# Patient Record
Sex: FEMALE | Race: WHITE | HISPANIC OR LATINO | Employment: FULL TIME | URBAN - METROPOLITAN AREA
[De-identification: names, ages, dates, MRNs, and addresses within clinical notes are randomized per-mention and may not be internally consistent; named-entity substitution may affect disease eponyms.]

---

## 2024-07-16 ENCOUNTER — TELEPHONE (OUTPATIENT)
Dept: OBSTETRICS AND GYNECOLOGY | Age: 69
End: 2024-07-16

## 2024-07-16 NOTE — TELEPHONE ENCOUNTER
[facility] staff attempted to follow warm transfer process and was unsuccessful     Caller: Jodi Wick    Relationship to patient: Self    Best call back number: [phone].- IS NEEDED.  IF NO ANSWER PLEASE CALL DAUGHTER JANY BARBOSA [phone]    Patient is needing: PATIENT ACCIDENTALLY CANCELED ULTRASOUND AND NEW GYN APPT ON 09/13/24 AT 12:30PM FOR ULTRASOUND AND 12:45 PM FOR NEW GYN WITH . PATIENT IS REQUESTING TO RESCHEDULE FOR SAME DAY AND TIME. [facility] WAS GIVEN OKAY BY OFFICE TO SCHEDULE ULTRASOUND. NEXT AVAILABLE IS 10/10/24 WITH BAKER. PATIENT IS REQUESTING TO BE SEEN SOONER.

## 2024-08-16 ENCOUNTER — OFFICE VISIT (OUTPATIENT)
Dept: OBSTETRICS AND GYNECOLOGY | Age: 69
End: 2024-08-16
Payer: COMMERCIAL

## 2024-08-16 VITALS
BODY MASS INDEX: 25.46 KG/M2 | WEIGHT: 110 LBS | SYSTOLIC BLOOD PRESSURE: 134 MMHG | HEIGHT: 55 IN | DIASTOLIC BLOOD PRESSURE: 72 MMHG

## 2024-08-16 DIAGNOSIS — Z12.31 SCREENING MAMMOGRAM FOR BREAST CANCER: ICD-10-CM

## 2024-08-16 DIAGNOSIS — N95.0 PMB (POSTMENOPAUSAL BLEEDING): ICD-10-CM

## 2024-08-16 DIAGNOSIS — Z13.89 SCREENING FOR BLOOD OR PROTEIN IN URINE: ICD-10-CM

## 2024-08-16 DIAGNOSIS — N85.9 LESION OF ENDOMETRIUM: ICD-10-CM

## 2024-08-16 DIAGNOSIS — R10.2 PELVIC PAIN: ICD-10-CM

## 2024-08-16 DIAGNOSIS — Z01.419 WELL FEMALE EXAM WITH ROUTINE GYNECOLOGICAL EXAM: Primary | ICD-10-CM

## 2024-08-16 RX ORDER — ENALAPRIL MALEATE 20 MG/1
10 TABLET ORAL
COMMUNITY
Start: 2024-08-15

## 2024-08-16 RX ORDER — HYDROCHLOROTHIAZIDE 25 MG/1
12.5 TABLET ORAL
COMMUNITY
Start: 2024-08-15

## 2024-08-16 RX ORDER — CETIRIZINE HYDROCHLORIDE 10 MG/1
10 TABLET ORAL
COMMUNITY
Start: 2024-07-17

## 2024-08-16 RX ORDER — ALBUTEROL SULFATE 90 UG/1
2 AEROSOL, METERED RESPIRATORY (INHALATION) EVERY 4 HOURS PRN
COMMUNITY
Start: 2024-08-08

## 2024-08-16 RX ORDER — AMLODIPINE BESYLATE 10 MG/1
5 TABLET ORAL
COMMUNITY
Start: 2024-08-15

## 2024-08-16 NOTE — PROGRESS NOTES
[facility]   Obstetrics and Gynecology     2024    Patient: Jodi Maier          MR#:[id number]    History of Present Illness    Chief Complaint   Patient presents with    Gynecologic Exam     CC: New Annual and U/S results. Right side pelvic pain past 1 year,        69 y.o. female  who presents for annual exam.    Patient is from [place] newly immigrated to the [place] with complaint of 1 history of lower back pain and pelvic pain.  Screening ultrasound is remarkable for a distended endometrial cavity with a fluid collection and a 1.2 cm echogenic lesion that appears most consistent with a polyp.  The patient does report some periodic bloody discharge consistent with postmenopausal bleeding.  Past medical history only remarkable for well-controlled hypertension.    Relevant data reviewed:    No LMP recorded (lmp unknown). Patient is postmenopausal.  Obstetric History:  OB History          3    Para   2    Term   2            AB   1    Living   2         SAB   1    IAB        Ectopic        Molar        Multiple        Live Births   2               Menstrual History:     No LMP recorded (lmp unknown). Patient is postmenopausal.       Social History     Substance and Sexual Activity   Sexual Activity Not Currently    Partners: Male     ______________________________________  There is no problem list on file for this patient.    Past Medical History:   Diagnosis Date    Hypertension      History reviewed. No pertinent surgical history.  Social History     Tobacco Use   Smoking Status Never    Passive exposure: Never   Smokeless Tobacco Never     Family History   Problem Relation Age of Onset    Hypertension Father     Hypertension Mother     Breast cancer Neg Hx     Ovarian cancer Neg Hx     Uterine cancer Neg Hx     Colon cancer Neg Hx      Prior to Admission medications    Medication Sig Start Date End Date Taking? Authorizing Provider   albuterol sulfate HFA  "108 (90 Base) MCG/ACT inhaler Inhale 2 puffs Every 4 (Four) Hours As Needed for Shortness of Air. 8/8/24  Yes Provider, MD Belgica   amLODIPine (NORVASC) 10 MG tablet 0.5 tablets. 8/15/24  Yes Provider, MD Belgica   cetirizine (zyrTEC) 10 MG tablet Take 1 tablet by mouth every night at bedtime. 7/17/24  Yes ProviderBelgica MD   enalapril (VASOTEC) 20 MG tablet 0.5 tablets. 8/15/24  Yes Provider, MD Belgica   hydroCHLOROthiazide 25 MG tablet 0.5 tablets. 8/15/24  Yes Provider, MD Belgica     _______________________________________    Current contraception: post menopausal status  History of abnormal Pap smear: no  Family history of uterine or ovarian cancer: no  Family History of colon cancer/colon polyps: no  History of abnormal mammogram: no  History of abnormal lipids: no    The following portions of the patient's history were reviewed and updated as appropriate: allergies, current medications, past family history, past medical history, past social history, past surgical history, and problem list.    Review of Systems    Pertinent items are noted in HPI.       Objective   Physical Exam    /72   Ht 121.9 cm (48\")   Wt 49.9 kg (110 lb)   LMP  (LMP Unknown)   BMI 33.57 kg/m²    BP Readings from Last 3 Encounters:   08/16/24 134/72      Wt Readings from Last 3 Encounters:   08/16/24 49.9 kg (110 lb)        BMI: Estimated body mass index is 33.57 kg/m² as calculated from the following:    Height as of this encounter: 121.9 cm (48\").    Weight as of this encounter: 49.9 kg (110 lb).       General: alert, appears stated age, and cooperative   Heart: regular rate and rhythm, S1, S2 normal, no murmur, click, rub or gallop   Lungs: clear to auscultation bilaterally   Abdomen: soft, non-tender, without masses, no organomegaly   Breast: inspection negative, no nipple discharge or bleeding, no masses or nodularity palpable   External genitalia/Vulva: External genitalia including bartholin's " glands, Urethra, Hollymead's gland and urethra meatus are normal, Perineum, rectum and anus appear normal , and Bladder appears normal without significant prolapse    Vagina: normal mucosa, normal discharge   Cervix: no lesions   Uterus: normal size and non-tender   Adnexa: normal adnexa     As part of wellness and prevention, the following topics were discussed with the patient:  Encouraged self breast exam  Physical activity and regular exercised encouraged.   Advance Care Planning   ACP discussion was held with the patient during this visit.           Problem List   Meds  History  Prep for Surg   Imagin}    Assessment:  Diagnoses and all orders for this visit:    1. Well female exam with routine gynecological exam (Primary)    2. Screening mammogram for breast cancer  -     Mammo Screening Digital Tomosynthesis Bilateral With CAD; Future    3. Screening for blood or protein in urine  -     Cancel: POC Urinalysis Dipstick    4. Lesion of endometrium  -     Case Request    5. PMB (postmenopausal bleeding)    6. Pelvic pain      Plan:  DILATATION AND CURETTAGE HYSTEROSCOPY WITH MYOSURE (N/A)      Shamar Reyes MD  2024 14:05 EDT

## 2024-08-20 ENCOUNTER — PRE-ADMISSION TESTING (OUTPATIENT)
Dept: PREADMISSION TESTING | Facility: HOSPITAL | Age: 69
End: 2024-08-20
Payer: COMMERCIAL

## 2024-08-20 VITALS
TEMPERATURE: 98.1 F | RESPIRATION RATE: 18 BRPM | OXYGEN SATURATION: 99 % | BODY MASS INDEX: 25.22 KG/M2 | HEART RATE: 80 BPM | WEIGHT: 109 LBS | DIASTOLIC BLOOD PRESSURE: 75 MMHG | SYSTOLIC BLOOD PRESSURE: 126 MMHG | HEIGHT: 55 IN

## 2024-08-20 LAB
ANION GAP SERPL CALCULATED.3IONS-SCNC: 11.7 MMOL/L (ref 5–15)
BUN SERPL-MCNC: 12 MG/DL (ref 8–23)
BUN/CREAT SERPL: 14.6 (ref 7–25)
CALCIUM SPEC-SCNC: 10.2 MG/DL (ref 8.6–10.5)
CHLORIDE SERPL-SCNC: 98 MMOL/L (ref 98–107)
CO2 SERPL-SCNC: 27.3 MMOL/L (ref 22–29)
CREAT SERPL-MCNC: 0.82 MG/DL (ref 0.57–1)
DEPRECATED RDW RBC AUTO: 38.4 FL (ref 37–54)
EGFRCR SERPLBLD CKD-EPI 2021: 78 ML/MIN/1.73
ERYTHROCYTE [DISTWIDTH] IN BLOOD BY AUTOMATED COUNT: 12.2 % (ref 12.3–15.4)
GLUCOSE SERPL-MCNC: 111 MG/DL (ref 65–99)
HCT VFR BLD AUTO: 43.7 % (ref 34–46.6)
HGB BLD-MCNC: 14.7 G/DL (ref 12–15.9)
MCH RBC QN AUTO: 29.1 PG (ref 26.6–33)
MCHC RBC AUTO-ENTMCNC: 33.6 G/DL (ref 31.5–35.7)
MCV RBC AUTO: 86.4 FL (ref 79–97)
PLATELET # BLD AUTO: 310 10*3/MM3 (ref 140–450)
PMV BLD AUTO: 9.2 FL (ref 6–12)
POTASSIUM SERPL-SCNC: 4.3 MMOL/L (ref 3.5–5.2)
RBC # BLD AUTO: 5.06 10*6/MM3 (ref 3.77–5.28)
SODIUM SERPL-SCNC: 137 MMOL/L (ref 136–145)
WBC NRBC COR # BLD AUTO: 9.28 10*3/MM3 (ref 3.4–10.8)

## 2024-08-20 PROCEDURE — 85027 COMPLETE CBC AUTOMATED: CPT

## 2024-08-20 PROCEDURE — 36415 COLL VENOUS BLD VENIPUNCTURE: CPT

## 2024-08-20 PROCEDURE — 80048 BASIC METABOLIC PNL TOTAL CA: CPT

## 2024-08-20 PROCEDURE — 93005 ELECTROCARDIOGRAM TRACING: CPT

## 2024-08-20 NOTE — DISCHARGE INSTRUCTIONS
Take the following medications the morning of surgery:      BRING YOUR INHALERS WITH YOU    If you are on prescription narcotic pain medication to control your pain you may also take that medication the morning of surgery.      General Instructions:     Do not eat solid food after midnight the night before surgery.  Clear liquids day of surgery are allowed but must be stopped at least two hours before your hospital arrival time.       Allowed clear liquids      Water, sodas, and tea or coffee with no cream or milk added.       12 to 20 ounces of a clear liquid that contains carbohydrates is recommended.  If non-diabetic, have Gatorade or Powerade.  If diabetic, have G2 or Powerade Zero.     Do not have liquids red in color.  Do not consume chicken, beef, pork or vegetable broth or bouillon cubes of any variety as they are not considered clear liquids and are not allowed.      Infants may have breast milk up to four hours before surgery.  Infants drinking formula may drink formula up to six hours before surgery.   Patients who avoid smoking, chewing tobacco and alcohol for 4 weeks prior to surgery have a reduced risk of post-operative complications.  Quit smoking as many days before surgery as you can.  Do not smoke, use chewing tobacco or drink alcohol the day of surgery.   If applicable bring your C-PAP/ BI-PAP machine in with you to preop day of surgery.  Bring any papers given to you in the doctor’s office.  Wear clean comfortable clothes.  Do not wear contact lenses, false eyelashes or make-up.  Bring a case for your glasses.   Bring crutches or walker if applicable.  Remove all piercings.  Leave jewelry and any other valuables at home.  Hair extensions with metal clips must be removed prior to surgery.  The Pre-Admission Testing nurse will instruct you to bring medications if unable to obtain an accurate list in Pre-Admission Testing.        Preventing a Surgical Site Infection:  For 2 to 3 days before surgery,  avoid shaving with a razor because the razor can irritate skin and make it easier to develop an infection.    Any areas of open skin can increase the risk of a post-operative wound infection by allowing bacteria to enter and travel throughout the body.  Notify your surgeon if you have any skin wounds / rashes even if it is not near the expected surgical site.  The area will need assessed to determine if surgery should be delayed until it is healed.  The night prior to surgery shower using a fresh bar of anti-bacterial soap (such as Dial) and clean washcloth.  Sleep in a clean bed with clean clothing.  Do not allow pets to sleep with you.  Shower on the morning of surgery using a fresh bar of anti-bacterial soap (such as Dial) and clean washcloth.  Dry with a clean towel and dress in clean clothing.  Ask your surgeon if you will be receiving antibiotics prior to surgery.  Make sure you, your family, and all healthcare providers clean their hands with soap and water or an alcohol based hand  before caring for you or your wound.    Day of surgery:  Your arrival time is approximately two hours before your scheduled surgery time.  Please note if you have an early arrival time the surgery doors do not open before 5:00 AM.  Upon arrival, a Pre-op nurse and Anesthesiologist will review your health history, obtain vital signs, and answer questions you may have.  The only belongings needed at this time will be a list of your home medications and if applicable your C-PAP/BI-PAP machine.  A Pre-op nurse will start an IV and you may receive medication in preparation for surgery, including something to help you relax.     Please be aware that surgery does come with discomfort.  We want to make every effort to control your discomfort so please discuss any uncontrolled symptoms with your nurse.   Your doctor will most likely have prescribed pain medications.      If you are going home after surgery you will receive  individualized written care instructions before being discharged.  A responsible adult must drive you to and from the hospital on the day of your surgery and ideally stay with you through the night.   .  Discharge prescriptions can be filled by the hospital pharmacy during regular pharmacy hours.  If you are having surgery late in the day/evening your prescription may be e-prescribed to your pharmacy.  Please verify your pharmacy hours or chose a 24 hour pharmacy to avoid not having access to your prescription because your pharmacy has closed for the day.    If you are staying overnight following surgery, you will be transported to your hospital room following the recovery period.  [facility] has all private rooms.    If you have any questions please call Pre-Admission Testing at [phone].  Deductibles and co-payments are collected on the day of service. Please be prepared to pay the required co-pay, deductible or deposit on the day of service as defined by your plan.    Call your surgeon immediately if you experience any of the following symptoms:  Sore Throat  Shortness of Breath or difficulty breathing  Cough  Chills  Body soreness or muscle pain  Headache  Fever  New loss of taste or smell  Do not arrive for your surgery ill.  Your procedure will need to be rescheduled to another time.  You will need to call your physician before the day of surgery to avoid any unnecessary exposure to hospital staff as well as other patients.

## 2024-08-21 PROBLEM — N95.0 PMB (POSTMENOPAUSAL BLEEDING): Status: ACTIVE | Noted: 2024-08-21

## 2024-08-21 LAB
CYTOLOGIST CVX/VAG CYTO: NORMAL
CYTOLOGY CVX/VAG DOC CYTO: NORMAL
CYTOLOGY CVX/VAG DOC THIN PREP: NORMAL
DX ICD CODE: NORMAL
HPV I/H RISK 4 DNA CVX QL PROBE+SIG AMP: NEGATIVE
Lab: NORMAL
OTHER STN SPEC: NORMAL
QT INTERVAL: 396 MS
QTC INTERVAL: 443 MS
STAT OF ADQ CVX/VAG CYTO-IMP: NORMAL

## 2024-08-22 ENCOUNTER — ANESTHESIA EVENT (OUTPATIENT)
Dept: PERIOP | Facility: HOSPITAL | Age: 69
End: 2024-08-22
Payer: COMMERCIAL

## 2024-08-22 ENCOUNTER — HOSPITAL ENCOUNTER (OUTPATIENT)
Facility: HOSPITAL | Age: 69
Setting detail: HOSPITAL OUTPATIENT SURGERY
Discharge: HOME | End: 2024-08-22
Attending: OBSTETRICS & GYNECOLOGY | Admitting: OBSTETRICS & GYNECOLOGY
Payer: COMMERCIAL

## 2024-08-22 ENCOUNTER — ANESTHESIA (OUTPATIENT)
Dept: PERIOP | Facility: HOSPITAL | Age: 69
End: 2024-08-22
Payer: COMMERCIAL

## 2024-08-22 VITALS
RESPIRATION RATE: 18 BRPM | TEMPERATURE: 97.8 F | HEART RATE: 69 BPM | DIASTOLIC BLOOD PRESSURE: 67 MMHG | SYSTOLIC BLOOD PRESSURE: 121 MMHG | OXYGEN SATURATION: 99 %

## 2024-08-22 DIAGNOSIS — N85.9 LESION OF ENDOMETRIUM: ICD-10-CM

## 2024-08-22 PROCEDURE — 25010000002 MIDAZOLAM PER 1 MG: Performed by: ANESTHESIOLOGY

## 2024-08-22 PROCEDURE — 88305 TISSUE EXAM BY PATHOLOGIST: CPT | Performed by: OBSTETRICS & GYNECOLOGY

## 2024-08-22 PROCEDURE — 25010000002 ONDANSETRON PER 1 MG: Performed by: NURSE ANESTHETIST, CERTIFIED REGISTERED

## 2024-08-22 PROCEDURE — 25010000002 DEXAMETHASONE SODIUM PHOSPHATE 20 MG/5ML SOLUTION: Performed by: NURSE ANESTHETIST, CERTIFIED REGISTERED

## 2024-08-22 PROCEDURE — 25010000002 CEFAZOLIN PER 500 MG: Performed by: OBSTETRICS & GYNECOLOGY

## 2024-08-22 PROCEDURE — C1782 MORCELLATOR: HCPCS | Performed by: OBSTETRICS & GYNECOLOGY

## 2024-08-22 PROCEDURE — 58561 HYSTEROSCOPY REMOVE MYOMA: CPT | Performed by: OBSTETRICS & GYNECOLOGY

## 2024-08-22 PROCEDURE — 25010000002 FENTANYL CITRATE (PF) 50 MCG/ML SOLUTION: Performed by: ANESTHESIOLOGY

## 2024-08-22 PROCEDURE — 25810000003 LACTATED RINGERS PER 1000 ML: Performed by: ANESTHESIOLOGY

## 2024-08-22 PROCEDURE — 25010000002 KETOROLAC TROMETHAMINE PER 15 MG: Performed by: NURSE ANESTHETIST, CERTIFIED REGISTERED

## 2024-08-22 PROCEDURE — 25010000002 PROPOFOL 200 MG/20ML EMULSION: Performed by: NURSE ANESTHETIST, CERTIFIED REGISTERED

## 2024-08-22 RX ORDER — ONDANSETRON 2 MG/ML
INJECTION INTRAMUSCULAR; INTRAVENOUS AS NEEDED
Status: DISCONTINUED | OUTPATIENT
Start: 2024-08-22 | End: 2024-08-22 | Stop reason: SURG

## 2024-08-22 RX ORDER — NALOXONE HCL 0.4 MG/ML
0.2 VIAL (ML) INJECTION AS NEEDED
Status: DISCONTINUED | OUTPATIENT
Start: 2024-08-22 | End: 2024-08-22 | Stop reason: HOSPADM

## 2024-08-22 RX ORDER — IPRATROPIUM BROMIDE AND ALBUTEROL SULFATE 2.5; .5 MG/3ML; MG/3ML
3 SOLUTION RESPIRATORY (INHALATION) ONCE AS NEEDED
Status: DISCONTINUED | OUTPATIENT
Start: 2024-08-22 | End: 2024-08-22 | Stop reason: HOSPADM

## 2024-08-22 RX ORDER — MAGNESIUM HYDROXIDE 1200 MG/15ML
LIQUID ORAL AS NEEDED
Status: DISCONTINUED | OUTPATIENT
Start: 2024-08-22 | End: 2024-08-22 | Stop reason: HOSPADM

## 2024-08-22 RX ORDER — FENTANYL CITRATE 50 UG/ML
50 INJECTION, SOLUTION INTRAMUSCULAR; INTRAVENOUS ONCE AS NEEDED
Status: COMPLETED | OUTPATIENT
Start: 2024-08-22 | End: 2024-08-22

## 2024-08-22 RX ORDER — EPHEDRINE SULFATE 50 MG/ML
5 INJECTION, SOLUTION INTRAVENOUS ONCE AS NEEDED
Status: DISCONTINUED | OUTPATIENT
Start: 2024-08-22 | End: 2024-08-22 | Stop reason: HOSPADM

## 2024-08-22 RX ORDER — IBUPROFEN 600 MG/1
600 TABLET, FILM COATED ORAL EVERY 6 HOURS PRN
Qty: 30 TABLET | Refills: 1 | Status: SHIPPED | OUTPATIENT
Start: 2024-08-22 | End: 2024-08-28 | Stop reason: HOSPADM

## 2024-08-22 RX ORDER — HYDROMORPHONE HYDROCHLORIDE 1 MG/ML
0.25 INJECTION, SOLUTION INTRAMUSCULAR; INTRAVENOUS; SUBCUTANEOUS
Status: DISCONTINUED | OUTPATIENT
Start: 2024-08-22 | End: 2024-08-22 | Stop reason: HOSPADM

## 2024-08-22 RX ORDER — DEXAMETHASONE SODIUM PHOSPHATE 4 MG/ML
INJECTION, SOLUTION INTRA-ARTICULAR; INTRALESIONAL; INTRAMUSCULAR; INTRAVENOUS; SOFT TISSUE AS NEEDED
Status: DISCONTINUED | OUTPATIENT
Start: 2024-08-22 | End: 2024-08-22 | Stop reason: SURG

## 2024-08-22 RX ORDER — PROPOFOL 10 MG/ML
INJECTION, EMULSION INTRAVENOUS AS NEEDED
Status: DISCONTINUED | OUTPATIENT
Start: 2024-08-22 | End: 2024-08-22 | Stop reason: SURG

## 2024-08-22 RX ORDER — LIDOCAINE HYDROCHLORIDE 20 MG/ML
INJECTION, SOLUTION INFILTRATION; PERINEURAL AS NEEDED
Status: DISCONTINUED | OUTPATIENT
Start: 2024-08-22 | End: 2024-08-22 | Stop reason: SURG

## 2024-08-22 RX ORDER — DIPHENHYDRAMINE HYDROCHLORIDE 50 MG/ML
12.5 INJECTION INTRAMUSCULAR; INTRAVENOUS
Status: DISCONTINUED | OUTPATIENT
Start: 2024-08-22 | End: 2024-08-22 | Stop reason: HOSPADM

## 2024-08-22 RX ORDER — MIDAZOLAM HYDROCHLORIDE 1 MG/ML
0.5 INJECTION INTRAMUSCULAR; INTRAVENOUS
Status: DISCONTINUED | OUTPATIENT
Start: 2024-08-22 | End: 2024-08-22 | Stop reason: HOSPADM

## 2024-08-22 RX ORDER — FLUMAZENIL 0.1 MG/ML
0.2 INJECTION INTRAVENOUS AS NEEDED
Status: DISCONTINUED | OUTPATIENT
Start: 2024-08-22 | End: 2024-08-22 | Stop reason: HOSPADM

## 2024-08-22 RX ORDER — PROMETHAZINE HYDROCHLORIDE 25 MG/1
25 SUPPOSITORY RECTAL ONCE AS NEEDED
Status: DISCONTINUED | OUTPATIENT
Start: 2024-08-22 | End: 2024-08-22 | Stop reason: HOSPADM

## 2024-08-22 RX ORDER — HYDROCODONE BITARTRATE AND ACETAMINOPHEN 7.5; 325 MG/1; MG/1
1 TABLET ORAL EVERY 4 HOURS PRN
Status: DISCONTINUED | OUTPATIENT
Start: 2024-08-22 | End: 2024-08-22 | Stop reason: HOSPADM

## 2024-08-22 RX ORDER — DROPERIDOL 2.5 MG/ML
0.62 INJECTION, SOLUTION INTRAMUSCULAR; INTRAVENOUS
Status: DISCONTINUED | OUTPATIENT
Start: 2024-08-22 | End: 2024-08-22 | Stop reason: HOSPADM

## 2024-08-22 RX ORDER — MONTELUKAST SODIUM 10 MG/1
10 TABLET ORAL NIGHTLY
COMMUNITY

## 2024-08-22 RX ORDER — LIDOCAINE HYDROCHLORIDE 10 MG/ML
0.5 INJECTION, SOLUTION INFILTRATION; PERINEURAL ONCE AS NEEDED
Status: DISCONTINUED | OUTPATIENT
Start: 2024-08-22 | End: 2024-08-22 | Stop reason: HOSPADM

## 2024-08-22 RX ORDER — FLUTICASONE PROPIONATE AND SALMETEROL 250; 50 UG/1; UG/1
1 POWDER RESPIRATORY (INHALATION) DAILY
COMMUNITY

## 2024-08-22 RX ORDER — FENTANYL CITRATE 50 UG/ML
25 INJECTION, SOLUTION INTRAMUSCULAR; INTRAVENOUS
Status: DISCONTINUED | OUTPATIENT
Start: 2024-08-22 | End: 2024-08-22 | Stop reason: HOSPADM

## 2024-08-22 RX ORDER — ONDANSETRON 2 MG/ML
4 INJECTION INTRAMUSCULAR; INTRAVENOUS ONCE AS NEEDED
Status: COMPLETED | OUTPATIENT
Start: 2024-08-22 | End: 2024-08-22

## 2024-08-22 RX ORDER — PROMETHAZINE HYDROCHLORIDE 25 MG/1
25 TABLET ORAL ONCE AS NEEDED
Status: DISCONTINUED | OUTPATIENT
Start: 2024-08-22 | End: 2024-08-22 | Stop reason: HOSPADM

## 2024-08-22 RX ORDER — HYDROCODONE BITARTRATE AND ACETAMINOPHEN 5; 325 MG/1; MG/1
1 TABLET ORAL ONCE AS NEEDED
Status: COMPLETED | OUTPATIENT
Start: 2024-08-22 | End: 2024-08-22

## 2024-08-22 RX ORDER — FAMOTIDINE 10 MG/ML
20 INJECTION, SOLUTION INTRAVENOUS ONCE
Status: COMPLETED | OUTPATIENT
Start: 2024-08-22 | End: 2024-08-22

## 2024-08-22 RX ORDER — HYDRALAZINE HYDROCHLORIDE 20 MG/ML
5 INJECTION INTRAMUSCULAR; INTRAVENOUS
Status: DISCONTINUED | OUTPATIENT
Start: 2024-08-22 | End: 2024-08-22 | Stop reason: HOSPADM

## 2024-08-22 RX ORDER — SODIUM CHLORIDE, SODIUM LACTATE, POTASSIUM CHLORIDE, CALCIUM CHLORIDE 600; 310; 30; 20 MG/100ML; MG/100ML; MG/100ML; MG/100ML
9 INJECTION, SOLUTION INTRAVENOUS CONTINUOUS
Status: DISCONTINUED | OUTPATIENT
Start: 2024-08-22 | End: 2024-08-22 | Stop reason: HOSPADM

## 2024-08-22 RX ORDER — KETOROLAC TROMETHAMINE 30 MG/ML
INJECTION, SOLUTION INTRAMUSCULAR; INTRAVENOUS AS NEEDED
Status: DISCONTINUED | OUTPATIENT
Start: 2024-08-22 | End: 2024-08-22 | Stop reason: SURG

## 2024-08-22 RX ORDER — SODIUM CHLORIDE 0.9 % (FLUSH) 0.9 %
3 SYRINGE (ML) INJECTION EVERY 12 HOURS SCHEDULED
Status: DISCONTINUED | OUTPATIENT
Start: 2024-08-22 | End: 2024-08-22 | Stop reason: HOSPADM

## 2024-08-22 RX ORDER — LABETALOL HYDROCHLORIDE 5 MG/ML
5 INJECTION, SOLUTION INTRAVENOUS
Status: DISCONTINUED | OUTPATIENT
Start: 2024-08-22 | End: 2024-08-22 | Stop reason: HOSPADM

## 2024-08-22 RX ORDER — SODIUM CHLORIDE 0.9 % (FLUSH) 0.9 %
3-10 SYRINGE (ML) INJECTION AS NEEDED
Status: DISCONTINUED | OUTPATIENT
Start: 2024-08-22 | End: 2024-08-22 | Stop reason: HOSPADM

## 2024-08-22 RX ORDER — HYDROCODONE BITARTRATE AND ACETAMINOPHEN 5; 325 MG/1; MG/1
1 TABLET ORAL EVERY 6 HOURS PRN
Qty: 5 TABLET | Refills: 0 | Status: SHIPPED | OUTPATIENT
Start: 2024-08-22

## 2024-08-22 RX ADMIN — LIDOCAINE HYDROCHLORIDE 60 MG: 20 INJECTION, SOLUTION INFILTRATION; PERINEURAL at 11:43

## 2024-08-22 RX ADMIN — SODIUM CHLORIDE, POTASSIUM CHLORIDE, SODIUM LACTATE AND CALCIUM CHLORIDE 9 ML/HR: 600; 310; 30; 20 INJECTION, SOLUTION INTRAVENOUS at 10:50

## 2024-08-22 RX ADMIN — PROPOFOL 100 MG: 10 INJECTION, EMULSION INTRAVENOUS at 11:43

## 2024-08-22 RX ADMIN — FENTANYL CITRATE 25 MCG: 50 INJECTION, SOLUTION INTRAMUSCULAR; INTRAVENOUS at 11:20

## 2024-08-22 RX ADMIN — HYDROCODONE BITARTRATE AND ACETAMINOPHEN 1 TABLET: 5; 325 TABLET ORAL at 12:52

## 2024-08-22 RX ADMIN — FAMOTIDINE 20 MG: 10 INJECTION INTRAVENOUS at 10:50

## 2024-08-22 RX ADMIN — ONDANSETRON 4 MG: 2 INJECTION INTRAMUSCULAR; INTRAVENOUS at 11:46

## 2024-08-22 RX ADMIN — KETOROLAC TROMETHAMINE 15 MG: 30 INJECTION, SOLUTION INTRAMUSCULAR; INTRAVENOUS at 12:02

## 2024-08-22 RX ADMIN — SODIUM CHLORIDE 2000 MG: 900 INJECTION INTRAVENOUS at 11:33

## 2024-08-22 RX ADMIN — DEXAMETHASONE SODIUM PHOSPHATE 6 MG: 4 INJECTION, SOLUTION INTRAMUSCULAR; INTRAVENOUS at 11:46

## 2024-08-22 RX ADMIN — MIDAZOLAM 0.5 MG: 1 INJECTION INTRAMUSCULAR; INTRAVENOUS at 11:07

## 2024-08-22 RX ADMIN — ONDANSETRON 4 MG: 2 INJECTION INTRAMUSCULAR; INTRAVENOUS at 14:09

## 2024-08-22 NOTE — H&P
[facility]   Obstetrics and Gynecology   History & Physical    2024    Patient: Jodi Maier          MR#:[id number]    Chief complaint:  PMB    Subjective     69 y.o. female  from [place] newly immig[place] to the [place] with complaint of 1 history of lower back pain and pelvic pain. Screening ultrasound is remarkable for a distended endometrial cavity with a fluid collection and a 1.2 cm echogenic lesion that appears most consistent with a polyp. The patient does report some periodic bloody discharge consistent with postmenopausal bleeding. Past medical history only remarkable for well-controlled hypertension.     US Non-ob Transvaginal (2024 13:24)     Patient Active Problem List   Diagnosis    Lesion of endometrium    PMB (postmenopausal bleeding)       Past Medical History:   Diagnosis Date    Acid reflux     Asthma     Hypertension     Pelvic pain     PMB (postmenopausal bleeding)        Past Surgical History:   Procedure Laterality Date    NO PAST SURGERIES         Obstetric History:  OB History          3    Para   2    Term   2            AB   1    Living   2         SAB   1    IAB        Ectopic        Molar        Multiple        Live Births   2               Menstrual History:     No LMP recorded (lmp unknown). Patient is postmenopausal.       # 1 - Date: , Sex: None, Weight: None, GA: None, Type: None, Apgar1: None, Apgar5: None, Living: None, Birth Comments: None    # 2 - Date: , Sex: Female, Weight: 2863 g (6 lb 5 oz), GA: 40w0d, Type: Vaginal, Spontaneous, Apgar1: None, Apgar5: None, Living: Living, Birth Comments: None    # 3 - Date: , Sex: Female, Weight: 2722 g (6 lb), GA: 40w0d, Type: Vaginal, Spontaneous, Apgar1: None, Apgar5: None, Living: Living, Birth Comments: None      Family History   Problem Relation Age of Onset    Hypertension Mother     Hypertension Father     Breast cancer Neg Hx     Ovarian cancer Neg Hx      Uterine cancer Neg Hx     Colon cancer Neg Hx     Malig Hyperthermia Neg Hx        Social History     Tobacco Use    Smoking status: Never     Passive exposure: Never    Smokeless tobacco: Never   Vaping Use    Vaping status: Never Used   Substance Use Topics    Alcohol use: Not Currently    Drug use: Never       Penicillins      Current Facility-Administered Medications:     ceFAZolin 2000 mg IVPB in 100 mL NS (MBP), 2,000 mg, Intravenous, Once, Shamar Reyes MD    lactated ringers infusion, 9 mL/hr, Intravenous, Continuous, Bay Rider MD, Last Rate: 9 mL/hr at 08/22/24 1050, 9 mL/hr at 08/22/24 1050    lidocaine (XYLOCAINE) 1 % injection 0.5 mL, 0.5 mL, Intradermal, Once PRN, Bay Rider MD    midazolam (VERSED) injection 0.5 mg, 0.5 mg, Intravenous, Q5 Min PRN, Bay Rider MD, 0.5 mg at 08/22/24 1107    sodium chloride 0.9 % flush 3 mL, 3 mL, Intravenous, Q12H, Bay Rider MD    sodium chloride 0.9 % flush 3-10 mL, 3-10 mL, Intravenous, PRN, Bay Rider MD    Review of Systems  Review of Systems   Constitutional: Negative.    Respiratory: Negative.     Cardiovascular: Negative.    Gastrointestinal: Negative.    Genitourinary: Negative.  Positive for menstrual problem and vaginal bleeding.   Psychiatric/Behavioral: Negative.         Objective     Vital Signs  Temp:  [97.9 °F (36.6 °C)] 97.9 °F (36.6 °C)  Heart Rate:  [111] 111  Resp:  [18] 18  BP: (167)/(68) 167/68    Physical Exam:  Physical Exam  Vitals and nursing note reviewed.   Constitutional:       Appearance: Normal appearance. She is well-developed.   HENT:      Head: Normocephalic and atraumatic.   Neck:      Thyroid: No thyromegaly.      Trachea: No tracheal deviation.   Pulmonary:      Effort: Pulmonary effort is normal.   Chest:      Chest wall: No mass.   Breasts:     Right: No mass, nipple discharge or skin change.      Left: No mass, nipple discharge or skin change.   Abdominal:      General: Bowel sounds are  normal.      Palpations: Abdomen is soft. There is no mass.      Tenderness: There is no abdominal tenderness. There is no rebound.      Hernia: No hernia is present. There is no hernia in the left inguinal area.   Genitourinary:     Labia:         Right: No rash or lesion.         Left: No rash or lesion.       Vagina: Normal.   Musculoskeletal:      Cervical back: Normal range of motion and neck supple.   Skin:     Findings: No rash.   Neurological:      Mental Status: She is alert and oriented to person, place, and time.      Deep Tendon Reflexes: Reflexes are normal and symmetric.   Psychiatric:         Behavior: Behavior normal.         Thought Content: Thought content normal.         Judgment: Judgment normal.         Labs:  Results from last 7 days   Lab Units 08/20/24  1410   WBC 10*3/mm3 9.28   HEMOGLOBIN g/dL 14.7   HEMATOCRIT % 43.7   PLATELETS 10*3/mm3 310     Results from last 7 days   Lab Units 08/20/24  1410   SODIUM mmol/L 137   POTASSIUM mmol/L 4.3   CHLORIDE mmol/L 98   CO2 mmol/L 27.3   BUN mg/dL 12   CREATININE mg/dL 0.82   CALCIUM mg/dL 10.2   GLUCOSE mg/dL 111*         Assessment & Plan       Lesion of endometrium    PMB (postmenopausal bleeding)         Plan:  Hysteroscopy, Dilatation and curettage, MyoSure resection of endometrial lesion       Reviewed procedure with patient.  I discussed the risks including but not limited to bleeding, infection and damage to internal organs.  Understanding of the procedure is voiced.     Shamar Reyes MD  08/22/24  11:25 EDT      Patient Care Team:  Provider, No Known as PCP - General

## 2024-08-22 NOTE — OP NOTE
DILATATION AND CURETTAGE HYSTEROSCOPY WITH MORCELLATOR  Procedure Report    Patient Name:  Jodi Maier  YOB: 1955    Date of Surgery:  8/22/2024     Indications:  pelvic pain, endometrial mass    Pre-op Diagnosis:   Lesion of endometrium [N85.9]       Post-Op Diagnosis Codes:     * Lesion of endometrium [N85.9]    Procedure/CPT® Codes:  DILATATION AND CURETTAGE HYSTEROSCOPY WITH MYOSURE    Staff:  Surgeon(s):  Shamar Reyes MD    Anesthesia: General    Estimated Blood Loss: minimal    Implants:    Nothing was implanted during the procedure    Specimen:          Specimens       ID Source Type Tests Collected By Collected At Frozen?    A Endometrial Curettings Tissue TISSUE PATHOLOGY EXAM   Shamar Reyes MD 8/22/24 1158     Description: Endometrial curettings    B Uterus Tissue TISSUE PATHOLOGY EXAM   Shamar Reyes MD 8/22/24 1158     Description: Uterine lesion            Findings: Atrophic cavity with 2.5 cm vascular moderately calcified lesion originating from the fundus appearing most consistent with a submucosal myoma    Complications: none    Description of Procedure:     The patient was taken to the operating room and placed in supine position.  General anesthesia was administered and the patient was prepped and draped in the usual sterile fashion with her legs placed in Jonatan stirrups.  Padding was provided so that her legs did not make contact with the Jonatan stirrups.  The surgical time-out is completed for the procedure    Weighted speculum was placed in the vagina and the cervix was grasped anteriorly with a single-tooth tenaculum.  Sequential dilators were used to dilate the cervix to 14 Amharic.  The uterus sounds to 7 cm.  The MyoSure scope was inserted and the cavity was distended with normal saline.  Findings were consistent with an atrophic endometrium with a calcified vascular lesion originating from the anterior fundal region.  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  Scant bleeding is noted    A #1 curet is taken and  the cavity is systematically curetted.  Fragments of endometrium are retained for pathological examination.    Patient is taken to the recovery room in stable condition.  Sponge lap needle counts correct x 2    See images in epic:  INTRAOPERATIVE PHOTOGRAPHS - [facility], 08/22/2024 (08/22/2024)     Shamar Reyes MD     Date: 8/22/2024  Time: 12:18 EDT

## 2024-08-22 NOTE — ANESTHESIA POSTPROCEDURE EVALUATION
Patient: Jodi Maier    Procedure Summary       Date: 08/22/24 Room / Location: [facility] OR [address] [place] [address] MAIN OR    Anesthesia Start: 1137 Anesthesia Stop: 1209    Procedure: DILATATION AND CURETTAGE HYSTEROSCOPY WITH MYOSURE (Uterus) Diagnosis:       Lesion of endometrium      (Lesion of endometrium [N85.9])    Surgeons: Shamar Reyes MD Provider: Rosemary Moser MD    Anesthesia Type: general ASA Status: 2            Anesthesia Type: general    Vitals  Vitals Value Taken Time   /67 08/22/24 1300   Temp 36.6 °C (97.8 °F) 08/22/24 1207   Pulse 70 08/22/24 1301   Resp 14 08/22/24 1215   SpO2 99 % 08/22/24 1301   Vitals shown include unfiled device data.        Post Anesthesia Care and Evaluation    Anesthetic complications: No anesthetic complications

## 2024-08-22 NOTE — ANESTHESIA PREPROCEDURE EVALUATION
Anesthesia Evaluation     NPO Solid Status: > 8 hours             Airway   Mallampati: II  TM distance: >3 FB  Neck ROM: full  Dental - normal exam   (+) upper dentures    Pulmonary    (+) asthma,  (-) wheezes  Cardiovascular     ECG reviewed  Rhythm: regular    (+) hypertension      Neuro/Psych  GI/Hepatic/Renal/Endo    (+) GERD    Musculoskeletal     Abdominal    Substance History      OB/GYN          Other                    Anesthesia Plan    ASA 2     general     (  D/W R&B of GA including but not limited to: heart, lung, liver, kidney, neurologic problems, positioning injuries, dental damage, corneal abrasion and TMJ.  .)  intravenous induction     Anesthetic plan, risks, benefits, and alternatives have been provided, discussed and informed consent has been obtained with: patient.    CODE STATUS:

## 2024-08-22 NOTE — ANESTHESIA PROCEDURE NOTES
Airway  Urgency: elective    Date/Time: 8/22/2024 11:44 AM  Airway not difficult    General Information and Staff    Patient location during procedure: OR  Anesthesiologist: Rosemary Moser MD  CRNA/CAA: Margo Olivares CRNA    Indications and Patient Condition  Indications for airway management: airway protection    Preoxygenated: yes  Mask difficulty assessment: 0 - not attempted    Final Airway Details  Final airway type: supraglottic airway      Successful airway: classic  Size 3     Number of attempts at approach: 1  Assessment: lips, teeth, and gum same as pre-op and atraumatic intubation

## 2024-08-23 ENCOUNTER — TELEPHONE (OUTPATIENT)
Dept: OBSTETRICS AND GYNECOLOGY | Age: 69
End: 2024-08-23

## 2024-08-25 ENCOUNTER — APPOINTMENT (OUTPATIENT)
Dept: CT IMAGING | Facility: HOSPITAL | Age: 69
End: 2024-08-25
Payer: COMMERCIAL

## 2024-08-25 ENCOUNTER — HOSPITAL ENCOUNTER (OUTPATIENT)
Facility: HOSPITAL | Age: 69
Discharge: HOME | End: 2024-08-28
Attending: EMERGENCY MEDICINE | Admitting: INTERNAL MEDICINE
Payer: COMMERCIAL

## 2024-08-25 DIAGNOSIS — K92.2 GASTROINTESTINAL HEMORRHAGE, UNSPECIFIED GASTROINTESTINAL HEMORRHAGE TYPE: Primary | ICD-10-CM

## 2024-08-25 DIAGNOSIS — K52.9 COLITIS: ICD-10-CM

## 2024-08-25 DIAGNOSIS — R42 DIZZINESS: ICD-10-CM

## 2024-08-25 DIAGNOSIS — K55.9 COLITIS, ISCHEMIC: ICD-10-CM

## 2024-08-25 DIAGNOSIS — R10.84 GENERALIZED ABDOMINAL PAIN: ICD-10-CM

## 2024-08-25 DIAGNOSIS — R55 VASOVAGAL NEAR SYNCOPE: ICD-10-CM

## 2024-08-25 LAB
ABO GROUP BLD: NORMAL
ALBUMIN SERPL-MCNC: 3.5 G/DL (ref 3.5–5.2)
ALBUMIN/GLOB SERPL: 1.6 G/DL
ALP SERPL-CCNC: 72 U/L (ref 39–117)
ALT SERPL W P-5'-P-CCNC: 14 U/L (ref 1–33)
ANION GAP SERPL CALCULATED.3IONS-SCNC: 12.7 MMOL/L (ref 5–15)
AST SERPL-CCNC: 17 U/L (ref 1–32)
BACTERIA UR QL AUTO: ABNORMAL /HPF
BASOPHILS # BLD AUTO: 0.06 10*3/MM3 (ref 0–0.2)
BASOPHILS NFR BLD AUTO: 0.4 % (ref 0–1.5)
BILIRUB SERPL-MCNC: 0.2 MG/DL (ref 0–1.2)
BILIRUB UR QL STRIP: NEGATIVE
BLD GP AB SCN SERPL QL: NEGATIVE
BUN SERPL-MCNC: 19 MG/DL (ref 8–23)
BUN/CREAT SERPL: 14.4 (ref 7–25)
CALCIUM SPEC-SCNC: 9.4 MG/DL (ref 8.6–10.5)
CHLORIDE SERPL-SCNC: 103 MMOL/L (ref 98–107)
CLARITY UR: CLEAR
CO2 SERPL-SCNC: 22.3 MMOL/L (ref 22–29)
COLOR UR: YELLOW
CREAT SERPL-MCNC: 1.32 MG/DL (ref 0.57–1)
DEPRECATED RDW RBC AUTO: 39.8 FL (ref 37–54)
EGFRCR SERPLBLD CKD-EPI 2021: 44.1 ML/MIN/1.73
EOSINOPHIL # BLD AUTO: 0.08 10*3/MM3 (ref 0–0.4)
EOSINOPHIL NFR BLD AUTO: 0.5 % (ref 0.3–6.2)
ERYTHROCYTE [DISTWIDTH] IN BLOOD BY AUTOMATED COUNT: 12.4 % (ref 12.3–15.4)
GLOBULIN UR ELPH-MCNC: 2.2 GM/DL
GLUCOSE SERPL-MCNC: 119 MG/DL (ref 65–99)
GLUCOSE UR STRIP-MCNC: NEGATIVE MG/DL
HCT VFR BLD AUTO: 35.7 % (ref 34–46.6)
HGB BLD-MCNC: 11.9 G/DL (ref 12–15.9)
HGB UR QL STRIP.AUTO: ABNORMAL
HYALINE CASTS UR QL AUTO: ABNORMAL /LPF
IMM GRANULOCYTES # BLD AUTO: 0.06 10*3/MM3 (ref 0–0.05)
IMM GRANULOCYTES NFR BLD AUTO: 0.4 % (ref 0–0.5)
KETONES UR QL STRIP: NEGATIVE
LEUKOCYTE ESTERASE UR QL STRIP.AUTO: ABNORMAL
LIPASE SERPL-CCNC: 103 U/L (ref 13–60)
LYMPHOCYTES # BLD AUTO: 4.66 10*3/MM3 (ref 0.7–3.1)
LYMPHOCYTES NFR BLD AUTO: 31.6 % (ref 19.6–45.3)
MCH RBC QN AUTO: 29.2 PG (ref 26.6–33)
MCHC RBC AUTO-ENTMCNC: 33.3 G/DL (ref 31.5–35.7)
MCV RBC AUTO: 87.5 FL (ref 79–97)
MONOCYTES # BLD AUTO: 0.83 10*3/MM3 (ref 0.1–0.9)
MONOCYTES NFR BLD AUTO: 5.6 % (ref 5–12)
NEUTROPHILS NFR BLD AUTO: 61.5 % (ref 42.7–76)
NEUTROPHILS NFR BLD AUTO: 9.08 10*3/MM3 (ref 1.7–7)
NITRITE UR QL STRIP: NEGATIVE
NRBC BLD AUTO-RTO: 0 /100 WBC (ref 0–0.2)
PH UR STRIP.AUTO: 7 [PH] (ref 5–8)
PLATELET # BLD AUTO: 213 10*3/MM3 (ref 140–450)
PMV BLD AUTO: 9.7 FL (ref 6–12)
POTASSIUM SERPL-SCNC: 3.5 MMOL/L (ref 3.5–5.2)
PROT SERPL-MCNC: 5.7 G/DL (ref 6–8.5)
PROT UR QL STRIP: ABNORMAL
QT INTERVAL: 390 MS
QTC INTERVAL: 464 MS
RBC # BLD AUTO: 4.08 10*6/MM3 (ref 3.77–5.28)
RBC # UR STRIP: ABNORMAL /HPF
REF LAB TEST METHOD: ABNORMAL
RH BLD: POSITIVE
SODIUM SERPL-SCNC: 138 MMOL/L (ref 136–145)
SP GR UR STRIP: 1.01 (ref 1–1.03)
SQUAMOUS #/AREA URNS HPF: ABNORMAL /HPF
T&S EXPIRATION DATE: NORMAL
TROPONIN T SERPL HS-MCNC: 11 NG/L
UROBILINOGEN UR QL STRIP: ABNORMAL
WBC # UR STRIP: ABNORMAL /HPF
WBC NRBC COR # BLD AUTO: 14.77 10*3/MM3 (ref 3.4–10.8)

## 2024-08-25 PROCEDURE — 25810000003 SODIUM CHLORIDE 0.9 % SOLUTION: Performed by: EMERGENCY MEDICINE

## 2024-08-25 PROCEDURE — 72125 CT NECK SPINE W/O DYE: CPT

## 2024-08-25 PROCEDURE — G0378 HOSPITAL OBSERVATION PER HR: HCPCS

## 2024-08-25 PROCEDURE — 36415 COLL VENOUS BLD VENIPUNCTURE: CPT

## 2024-08-25 PROCEDURE — 85025 COMPLETE CBC W/AUTO DIFF WBC: CPT | Performed by: EMERGENCY MEDICINE

## 2024-08-25 PROCEDURE — 86901 BLOOD TYPING SEROLOGIC RH(D): CPT | Performed by: EMERGENCY MEDICINE

## 2024-08-25 PROCEDURE — 25510000001 IOPAMIDOL PER 1 ML: Performed by: EMERGENCY MEDICINE

## 2024-08-25 PROCEDURE — 80053 COMPREHEN METABOLIC PANEL: CPT | Performed by: EMERGENCY MEDICINE

## 2024-08-25 PROCEDURE — 96375 TX/PRO/DX INJ NEW DRUG ADDON: CPT

## 2024-08-25 PROCEDURE — 86900 BLOOD TYPING SEROLOGIC ABO: CPT | Performed by: EMERGENCY MEDICINE

## 2024-08-25 PROCEDURE — 86850 RBC ANTIBODY SCREEN: CPT | Performed by: EMERGENCY MEDICINE

## 2024-08-25 PROCEDURE — 70450 CT HEAD/BRAIN W/O DYE: CPT

## 2024-08-25 PROCEDURE — 84484 ASSAY OF TROPONIN QUANT: CPT | Performed by: EMERGENCY MEDICINE

## 2024-08-25 PROCEDURE — 74177 CT ABD & PELVIS W/CONTRAST: CPT

## 2024-08-25 PROCEDURE — 93010 ELECTROCARDIOGRAM REPORT: CPT | Performed by: INTERNAL MEDICINE

## 2024-08-25 PROCEDURE — 99291 CRITICAL CARE FIRST HOUR: CPT

## 2024-08-25 PROCEDURE — 83690 ASSAY OF LIPASE: CPT | Performed by: EMERGENCY MEDICINE

## 2024-08-25 PROCEDURE — 71275 CT ANGIOGRAPHY CHEST: CPT

## 2024-08-25 PROCEDURE — 93005 ELECTROCARDIOGRAM TRACING: CPT | Performed by: EMERGENCY MEDICINE

## 2024-08-25 PROCEDURE — 81001 URINALYSIS AUTO W/SCOPE: CPT | Performed by: EMERGENCY MEDICINE

## 2024-08-25 RX ORDER — PANTOPRAZOLE SODIUM 40 MG/10ML
80 INJECTION, POWDER, LYOPHILIZED, FOR SOLUTION INTRAVENOUS ONCE
Status: COMPLETED | OUTPATIENT
Start: 2024-08-25 | End: 2024-08-25

## 2024-08-25 RX ORDER — SODIUM CHLORIDE 0.9 % (FLUSH) 0.9 %
10 SYRINGE (ML) INJECTION AS NEEDED
Status: DISCONTINUED | OUTPATIENT
Start: 2024-08-25 | End: 2024-08-28 | Stop reason: HOSPADM

## 2024-08-25 RX ORDER — IOPAMIDOL 755 MG/ML
100 INJECTION, SOLUTION INTRAVASCULAR
Status: COMPLETED | OUTPATIENT
Start: 2024-08-25 | End: 2024-08-25

## 2024-08-25 RX ADMIN — PANTOPRAZOLE SODIUM 80 MG: 40 INJECTION, POWDER, FOR SOLUTION INTRAVENOUS at 21:07

## 2024-08-25 RX ADMIN — IOPAMIDOL 95 ML: 755 INJECTION, SOLUTION INTRAVENOUS at 20:36

## 2024-08-25 RX ADMIN — SODIUM CHLORIDE 1000 ML: 9 INJECTION, SOLUTION INTRAVENOUS at 20:45

## 2024-08-25 NOTE — ED PROVIDER NOTES
EMERGENCY DEPARTMENT ENCOUNTER    Room Number:  11/11  PCP: Provider, No Known    HPI:  Chief Complaint: Nausea  A complete HPI/ROS/PMH/PSH/SH/FH are unobtainable due to: None  Context: Jodi Maier is a 69 y.o. female who presents to the ED c/o acute nausea with lightheadedness and generalized fatigue.  Daughter states that she has been feeling weak all day.  Complained of nausea and some vague abdominal pain.  Patient cannot describe this better.  Recently had D&C performed 3 days ago for endometrial mass.  She has had some vaginal spotting but no heavy bleeding.  She threw up once on the patio when she was outside by the pool with family.  She denies having any headache, chest pain or shortness of breath.        PAST MEDICAL HISTORY  Active Ambulatory Problems     Diagnosis Date Noted    Lesion of endometrium 08/16/2024    PMB (postmenopausal bleeding) 08/21/2024     Resolved Ambulatory Problems     Diagnosis Date Noted    No Resolved Ambulatory Problems     Past Medical History:   Diagnosis Date    Acid reflux     Asthma     Hypertension     Pelvic pain          PAST SURGICAL HISTORY  Past Surgical History:   Procedure Laterality Date    D & C HYSTEROSCOPY N/A 8/22/2024    Procedure: DILATATION AND CURETTAGE HYSTEROSCOPY WITH MYOSURE;  Surgeon: Shamar Reyes MD;  Location: [facility];  Service: Obstetrics/Gynecology;  Laterality: N/A;    NO PAST SURGERIES           FAMILY HISTORY  Family History   Problem Relation Age of Onset    Hypertension Mother     Hypertension Father     Breast cancer Neg Hx     Ovarian cancer Neg Hx     Uterine cancer Neg Hx     Colon cancer Neg Hx     Malig Hyperthermia Neg Hx          SOCIAL HISTORY  Social History     Socioeconomic History    Marital status:    Tobacco Use    Smoking status: Never     Passive exposure: Never    Smokeless tobacco: Never   Vaping Use    Vaping status: Never Used   Substance and Sexual Activity    Alcohol use: Not Currently     Drug use: Never    Sexual activity: Not Currently     Partners: Male         ALLERGIES  Penicillins        REVIEW OF SYSTEMS  Review of Systems     Included in HPI  All systems reviewed and negative except for those discussed in HPI.       PHYSICAL EXAM  ED Triage Vitals [08/25/24 1931]   Temp Heart Rate Resp BP SpO2   98.3 °F (36.8 °C) 88 16 123/66 94 %      Temp src Heart Rate Source Patient Position BP Location FiO2 (%)   Oral -- -- -- --       Physical Exam      GENERAL: no acute distress  HENT: nares patent  EYES: no scleral icterus  CV: regular rhythm, normal rate  RESPIRATORY: normal effort, clear to auscultation bilaterally  ABDOMEN: soft, nontender  MUSCULOSKELETAL: no deformity  NEURO: alert, EOMI, no facial asymmetry, moves all extremities, follows commands  PSYCH:  calm, cooperative  SKIN: warm, dry    Vital signs and nursing notes reviewed.          LAB RESULTS  No results found for this or any previous visit (from the past 24 hour(s)).    Ordered the above labs and reviewed the results.        RADIOLOGY  No Radiology Exams Resulted Within Past 24 Hours    Ordered the above noted radiological studies. Reviewed by me in PACS.        MEDICATIONS GIVEN IN ER  Medications   sodium chloride 0.9 % flush 10 mL (has no administration in time range)   sodium chloride 0.9 % bolus 1,000 mL (has no administration in time range)         ORDERS PLACED DURING THIS VISIT:  Orders Placed This Encounter   Procedures    CT Abdomen Pelvis With Contrast    Comprehensive Metabolic Panel    Urinalysis With Microscopic If Indicated (No Culture) - Urine, Clean Catch    Lipase    CBC Auto Differential    Single High Sensitivity Troponin T    ECG 12 Lead Other; weakness    Insert Peripheral IV    CBC & Differential         OUTPATIENT MEDICATION MANAGEMENT:  Current Facility-Administered Medications Ordered in Epic   Medication Dose Route Frequency Provider Last Rate Last Admin    sodium chloride 0.9 % bolus 1,000 mL  1,000 mL  Intravenous Once Jesus Gates II, MD        sodium chloride 0.9 % flush 10 mL  10 mL Intravenous PRN Jesus Gates II, MD         Current Outpatient Medications Ordered in Epic   Medication Sig Dispense Refill    albuterol sulfate  (90 Base) MCG/ACT inhaler Inhale 2 puffs Every 4 (Four) Hours As Needed for Shortness of Air.      amLODIPine (NORVASC) 10 MG tablet Take 0.5 tablets by mouth Every Night.      cetirizine (zyrTEC) 10 MG tablet Take 1 tablet by mouth every night at bedtime.      enalapril (VASOTEC) 20 MG tablet Take 0.5 tablets by mouth Daily.      Fluticasone-Salmeterol (ADVAIR/WIXELA) 250-50 MCG/ACT DISKUS Inhale 1 puff Daily.      hydroCHLOROthiazide 25 MG tablet Take 1 tablet by mouth Daily.      HYDROcodone-acetaminophen (Norco) 5-325 MG per tablet Take 1 tablet by mouth Every 6 (Six) Hours As Needed for Severe Pain. 5 tablet 0    ibuprofen (ADVIL,MOTRIN) 600 MG tablet Take 1 tablet by mouth Every 6 (Six) Hours As Needed for Moderate Pain. 30 tablet 1    MAGNESIUM PO Take 1 tablet by mouth Daily.      montelukast (SINGULAIR) 10 MG tablet Take 1 tablet by mouth Every Night.      UNKNOWN TO PATIENT Inhale 1 puff Daily. INSTRUCTED TO BRING DAY OF SURGERY         PROCEDURES  Critical Care    Performed by: Jesus Gates II, MD  Authorized by: Jesus Gates II, MD    Critical care provider statement:     Critical care time (minutes):  30    Critical care was necessary to treat or prevent imminent or life-threatening deterioration of the following conditions:  Shock (GI bleed)    Critical care was time spent personally by me on the following activities:  Ordering and review of laboratory studies, ordering and review of radiographic studies, ordering and performing treatments and interventions, pulse oximetry, re-evaluation of patient's condition, discussions with consultants, evaluation of patient's response to treatment, examination of patient and obtaining history from  patient or surrogate            MEDICAL DECISION MAKING, PROGRESS, and CONSULTS    Discussion below represents my analysis of pertinent findings related to patient's condition, differential diagnosis, treatment plan and final disposition.          Differential diagnosis:    Cardiac arrhythmia, postoperative complication such as bleeding, volume depletion, underlying infection with IV intra-abdominal infection such as appendicitis or diverticulitis or gastroenteritis.  Given her absence of chest pain or shortness of breath, have a low suspicion for PE.                 Independent interpretation of labs, radiology studies, and discussions with consultants:  ED Course as of 08/26/24 1745   Sun Aug 25, 2024   1943 On medical chart review, reviewed Dr. Shamar Reyes's operative note.  Patient had dilation and curettage hysteroscopy with morcellator on 8/22/2024.  This was performed for a lesion of the endometrium.  Minimal blood loss noted. [TD]   2012 Patient had a near syncopal event in the bathroom. [TD]   2046 Hemoglobin(!): 11.9  Patient had a bowel movement.  She had brown stool mixed with red blood.  Her blood pressure initially was low after the syncopal event.  However it is now normalized.  Continuing IV fluid [TD]   2053 I discussed the case with GUANAKITO Evans.  He will follow along in consultation.  We will keep the patient NPO. [TD]   2116 Lipase(!): 103 [TD]   2130 EKG independently interpreted by myself.  Time 8:59 PM.  Sinus rhythm.  Heart rate 85.  Normal intervals and axis.  Left atrial normality.  No acute ST abnormality. [TD]   2244 I discussed the case with CARMENZA Matute.  She will admit for Dr. Farrell. [TD]      ED Course User Index  [TD] Jesus Gates II, MD               DIAGNOSIS  Final diagnoses:   Gastrointestinal hemorrhage, unspecified gastrointestinal hemorrhage type   Vasovagal near syncope         DISPOSITION  Admit      Latest Documented Vital Signs:  As of 19:49 EDT  BP-  123/66 HR- 88 Temp- 98.3 °F (36.8 °C) (Oral) O2 sat- 94%      --    Please note that portions of this were completed with a voice recognition program.       Note Disclaimer: At [facility], we believe that sharing information builds trust and better relationships. You are receiving this note because you are receiving care at [facility] or recently visited. It is possible you will see health information before a provider has talked with you about it. This kind of information can be easy to misunderstand. To help you fully understand what it means for your health, we urge you to discuss this note with your provider.         Jesus Gates II, MD  08/26/24 7884

## 2024-08-26 PROBLEM — I10 HYPERTENSION: Status: ACTIVE | Noted: 2024-08-26

## 2024-08-26 PROBLEM — K52.9 COLITIS: Status: ACTIVE | Noted: 2024-08-26

## 2024-08-26 PROBLEM — J45.909 ASTHMA: Status: ACTIVE | Noted: 2024-08-26

## 2024-08-26 PROBLEM — R10.9 ABDOMINAL PAIN: Status: ACTIVE | Noted: 2024-08-26

## 2024-08-26 PROBLEM — R42 DIZZINESS: Status: ACTIVE | Noted: 2024-08-26

## 2024-08-26 LAB
ADV 40+41 DNA STL QL NAA+NON-PROBE: NOT DETECTED
ALBUMIN SERPL-MCNC: 3.6 G/DL (ref 3.5–5.2)
ALBUMIN/GLOB SERPL: 1.6 G/DL
ALP SERPL-CCNC: 69 U/L (ref 39–117)
ALT SERPL W P-5'-P-CCNC: 13 U/L (ref 1–33)
ANION GAP SERPL CALCULATED.3IONS-SCNC: 10.1 MMOL/L (ref 5–15)
ANION GAP SERPL CALCULATED.3IONS-SCNC: 9.8 MMOL/L (ref 5–15)
AST SERPL-CCNC: 17 U/L (ref 1–32)
ASTRO TYP 1-8 RNA STL QL NAA+NON-PROBE: NOT DETECTED
BILIRUB SERPL-MCNC: 0.4 MG/DL (ref 0–1.2)
BUN SERPL-MCNC: 11 MG/DL (ref 8–23)
BUN SERPL-MCNC: 16 MG/DL (ref 8–23)
BUN/CREAT SERPL: 12.9 (ref 7–25)
BUN/CREAT SERPL: 18 (ref 7–25)
C CAYETANENSIS DNA STL QL NAA+NON-PROBE: NOT DETECTED
C COLI+JEJ+UPSA DNA STL QL NAA+NON-PROBE: NOT DETECTED
C DIFF TOX GENS STL QL NAA+PROBE: NEGATIVE
CALCIUM SPEC-SCNC: 8.7 MG/DL (ref 8.6–10.5)
CALCIUM SPEC-SCNC: 9.4 MG/DL (ref 8.6–10.5)
CHLORIDE SERPL-SCNC: 102 MMOL/L (ref 98–107)
CHLORIDE SERPL-SCNC: 105 MMOL/L (ref 98–107)
CO2 SERPL-SCNC: 24.2 MMOL/L (ref 22–29)
CO2 SERPL-SCNC: 24.9 MMOL/L (ref 22–29)
CREAT SERPL-MCNC: 0.85 MG/DL (ref 0.57–1)
CREAT SERPL-MCNC: 0.89 MG/DL (ref 0.57–1)
CRYPTOSP DNA STL QL NAA+NON-PROBE: NOT DETECTED
CYTO UR: NORMAL
DEPRECATED RDW RBC AUTO: 39.6 FL (ref 37–54)
DEPRECATED RDW RBC AUTO: 39.7 FL (ref 37–54)
E HISTOLYT DNA STL QL NAA+NON-PROBE: NOT DETECTED
EAEC PAA PLAS AGGR+AATA ST NAA+NON-PRB: NOT DETECTED
EC STX1+STX2 GENES STL QL NAA+NON-PROBE: NOT DETECTED
EGFRCR SERPLBLD CKD-EPI 2021: 70.7 ML/MIN/1.73
EGFRCR SERPLBLD CKD-EPI 2021: 74.7 ML/MIN/1.73
EPEC EAE GENE STL QL NAA+NON-PROBE: NOT DETECTED
ERYTHROCYTE [DISTWIDTH] IN BLOOD BY AUTOMATED COUNT: 12.5 % (ref 12.3–15.4)
ERYTHROCYTE [DISTWIDTH] IN BLOOD BY AUTOMATED COUNT: 12.9 % (ref 12.3–15.4)
ETEC LTA+ST1A+ST1B TOX ST NAA+NON-PROBE: NOT DETECTED
FERRITIN SERPL-MCNC: 185 NG/ML (ref 13–150)
G LAMBLIA DNA STL QL NAA+NON-PROBE: NOT DETECTED
GLOBULIN UR ELPH-MCNC: 2.3 GM/DL
GLUCOSE SERPL-MCNC: 114 MG/DL (ref 65–99)
GLUCOSE SERPL-MCNC: 133 MG/DL (ref 65–99)
HCT VFR BLD AUTO: 35.8 % (ref 34–46.6)
HCT VFR BLD AUTO: 35.8 % (ref 34–46.6)
HCT VFR BLD AUTO: 38.5 % (ref 34–46.6)
HCT VFR BLD AUTO: 38.5 % (ref 34–46.6)
HEMOCCULT STL QL: POSITIVE
HGB BLD-MCNC: 12.2 G/DL (ref 12–15.9)
HGB BLD-MCNC: 12.2 G/DL (ref 12–15.9)
HGB BLD-MCNC: 12.8 G/DL (ref 12–15.9)
HGB BLD-MCNC: 12.8 G/DL (ref 12–15.9)
INR PPP: 1.02 (ref 0.9–1.1)
IRON 24H UR-MRATE: 36 MCG/DL (ref 37–145)
IRON SATN MFR SERPL: 12 % (ref 20–50)
LAB AP CASE REPORT: NORMAL
MCH RBC QN AUTO: 29 PG (ref 26.6–33)
MCH RBC QN AUTO: 29.3 PG (ref 26.6–33)
MCHC RBC AUTO-ENTMCNC: 33.2 G/DL (ref 31.5–35.7)
MCHC RBC AUTO-ENTMCNC: 34.1 G/DL (ref 31.5–35.7)
MCV RBC AUTO: 86.1 FL (ref 79–97)
MCV RBC AUTO: 87.1 FL (ref 79–97)
NOROVIRUS GI+II RNA STL QL NAA+NON-PROBE: NOT DETECTED
P SHIGELLOIDES DNA STL QL NAA+NON-PROBE: NOT DETECTED
PATH REPORT.FINAL DX SPEC: NORMAL
PATH REPORT.GROSS SPEC: NORMAL
PLATELET # BLD AUTO: 202 10*3/MM3 (ref 140–450)
PLATELET # BLD AUTO: 216 10*3/MM3 (ref 140–450)
PMV BLD AUTO: 9.5 FL (ref 6–12)
PMV BLD AUTO: 9.6 FL (ref 6–12)
POTASSIUM SERPL-SCNC: 3.2 MMOL/L (ref 3.5–5.2)
POTASSIUM SERPL-SCNC: 3.7 MMOL/L (ref 3.5–5.2)
PROT SERPL-MCNC: 5.9 G/DL (ref 6–8.5)
PROTHROMBIN TIME: 13.6 SECONDS (ref 11.7–14.2)
RBC # BLD AUTO: 4.16 10*6/MM3 (ref 3.77–5.28)
RBC # BLD AUTO: 4.42 10*6/MM3 (ref 3.77–5.28)
RVA RNA STL QL NAA+NON-PROBE: NOT DETECTED
S ENT+BONG DNA STL QL NAA+NON-PROBE: NOT DETECTED
SAPO I+II+IV+V RNA STL QL NAA+NON-PROBE: NOT DETECTED
SHIGELLA SP+EIEC IPAH ST NAA+NON-PROBE: NOT DETECTED
SODIUM SERPL-SCNC: 136 MMOL/L (ref 136–145)
SODIUM SERPL-SCNC: 140 MMOL/L (ref 136–145)
TIBC SERPL-MCNC: 304 MCG/DL (ref 298–536)
TRANSFERRIN SERPL-MCNC: 204 MG/DL (ref 200–360)
V CHOL+PARA+VUL DNA STL QL NAA+NON-PROBE: NOT DETECTED
V CHOLERAE DNA STL QL NAA+NON-PROBE: NOT DETECTED
WBC NRBC COR # BLD AUTO: 12.81 10*3/MM3 (ref 3.4–10.8)
WBC NRBC COR # BLD AUTO: 13.84 10*3/MM3 (ref 3.4–10.8)
Y ENTEROCOL DNA STL QL NAA+NON-PROBE: NOT DETECTED

## 2024-08-26 PROCEDURE — 25010000002 ONDANSETRON PER 1 MG: Performed by: NURSE PRACTITIONER

## 2024-08-26 PROCEDURE — 96375 TX/PRO/DX INJ NEW DRUG ADDON: CPT

## 2024-08-26 PROCEDURE — 87507 IADNA-DNA/RNA PROBE TQ 12-25: CPT | Performed by: EMERGENCY MEDICINE

## 2024-08-26 PROCEDURE — 96366 THER/PROPH/DIAG IV INF ADDON: CPT

## 2024-08-26 PROCEDURE — 25810000003 SODIUM CHLORIDE 0.9 % SOLUTION: Performed by: NURSE PRACTITIONER

## 2024-08-26 PROCEDURE — 83540 ASSAY OF IRON: CPT | Performed by: INTERNAL MEDICINE

## 2024-08-26 PROCEDURE — 85014 HEMATOCRIT: CPT | Performed by: NURSE PRACTITIONER

## 2024-08-26 PROCEDURE — 85018 HEMOGLOBIN: CPT | Performed by: NURSE PRACTITIONER

## 2024-08-26 PROCEDURE — 96376 TX/PRO/DX INJ SAME DRUG ADON: CPT

## 2024-08-26 PROCEDURE — 85018 HEMOGLOBIN: CPT | Performed by: INTERNAL MEDICINE

## 2024-08-26 PROCEDURE — 80053 COMPREHEN METABOLIC PANEL: CPT | Performed by: NURSE PRACTITIONER

## 2024-08-26 PROCEDURE — 85027 COMPLETE CBC AUTOMATED: CPT

## 2024-08-26 PROCEDURE — 82272 OCCULT BLD FECES 1-3 TESTS: CPT | Performed by: INTERNAL MEDICINE

## 2024-08-26 PROCEDURE — G0378 HOSPITAL OBSERVATION PER HR: HCPCS

## 2024-08-26 PROCEDURE — 87493 C DIFF AMPLIFIED PROBE: CPT | Performed by: EMERGENCY MEDICINE

## 2024-08-26 PROCEDURE — 94664 DEMO&/EVAL PT USE INHALER: CPT

## 2024-08-26 PROCEDURE — 25010000002 PROCHLORPERAZINE 10 MG/2ML SOLUTION: Performed by: NURSE PRACTITIONER

## 2024-08-26 PROCEDURE — 25010000002 POTASSIUM CHLORIDE 10 MEQ/100ML SOLUTION: Performed by: INTERNAL MEDICINE

## 2024-08-26 PROCEDURE — 25010000002 MORPHINE PER 10 MG: Performed by: INTERNAL MEDICINE

## 2024-08-26 PROCEDURE — 25010000002 MORPHINE PER 10 MG: Performed by: NURSE PRACTITIONER

## 2024-08-26 PROCEDURE — 94640 AIRWAY INHALATION TREATMENT: CPT

## 2024-08-26 PROCEDURE — 99204 OFFICE O/P NEW MOD 45 MIN: CPT

## 2024-08-26 PROCEDURE — 96361 HYDRATE IV INFUSION ADD-ON: CPT

## 2024-08-26 PROCEDURE — 84466 ASSAY OF TRANSFERRIN: CPT | Performed by: INTERNAL MEDICINE

## 2024-08-26 PROCEDURE — 82728 ASSAY OF FERRITIN: CPT | Performed by: INTERNAL MEDICINE

## 2024-08-26 PROCEDURE — 94799 UNLISTED PULMONARY SVC/PX: CPT

## 2024-08-26 PROCEDURE — 96365 THER/PROPH/DIAG IV INF INIT: CPT

## 2024-08-26 PROCEDURE — 85014 HEMATOCRIT: CPT | Performed by: INTERNAL MEDICINE

## 2024-08-26 PROCEDURE — 85610 PROTHROMBIN TIME: CPT

## 2024-08-26 PROCEDURE — 85027 COMPLETE CBC AUTOMATED: CPT | Performed by: NURSE PRACTITIONER

## 2024-08-26 RX ORDER — CETIRIZINE HYDROCHLORIDE 10 MG/1
10 TABLET ORAL NIGHTLY
Status: DISCONTINUED | OUTPATIENT
Start: 2024-08-26 | End: 2024-08-28 | Stop reason: HOSPADM

## 2024-08-26 RX ORDER — ONDANSETRON 2 MG/ML
4 INJECTION INTRAMUSCULAR; INTRAVENOUS EVERY 6 HOURS PRN
Status: DISCONTINUED | OUTPATIENT
Start: 2024-08-26 | End: 2024-08-28 | Stop reason: HOSPADM

## 2024-08-26 RX ORDER — POTASSIUM CHLORIDE 7.45 MG/ML
10 INJECTION INTRAVENOUS
Status: DISCONTINUED | OUTPATIENT
Start: 2024-08-26 | End: 2024-08-28 | Stop reason: HOSPADM

## 2024-08-26 RX ORDER — POLYETHYLENE GLYCOL 3350 17 G/17G
0.5 POWDER, FOR SOLUTION ORAL EVERY 12 HOURS
Status: COMPLETED | OUTPATIENT
Start: 2024-08-26 | End: 2024-08-27

## 2024-08-26 RX ORDER — PROCHLORPERAZINE EDISYLATE 5 MG/ML
5 INJECTION INTRAMUSCULAR; INTRAVENOUS ONCE
Status: COMPLETED | OUTPATIENT
Start: 2024-08-26 | End: 2024-08-26

## 2024-08-26 RX ORDER — ACETAMINOPHEN 325 MG/1
650 TABLET ORAL EVERY 4 HOURS PRN
Status: DISCONTINUED | OUTPATIENT
Start: 2024-08-26 | End: 2024-08-28 | Stop reason: HOSPADM

## 2024-08-26 RX ORDER — SIMETHICONE 80 MG
80 TABLET,CHEWABLE ORAL 4 TIMES DAILY PRN
Status: DISCONTINUED | OUTPATIENT
Start: 2024-08-26 | End: 2024-08-28 | Stop reason: HOSPADM

## 2024-08-26 RX ORDER — HYDROCODONE BITARTRATE AND ACETAMINOPHEN 5; 325 MG/1; MG/1
1 TABLET ORAL EVERY 4 HOURS PRN
Status: DISCONTINUED | OUTPATIENT
Start: 2024-08-26 | End: 2024-08-28 | Stop reason: HOSPADM

## 2024-08-26 RX ORDER — PANTOPRAZOLE SODIUM 40 MG/10ML
40 INJECTION, POWDER, LYOPHILIZED, FOR SOLUTION INTRAVENOUS EVERY 12 HOURS SCHEDULED
Status: DISCONTINUED | OUTPATIENT
Start: 2024-08-26 | End: 2024-08-28

## 2024-08-26 RX ORDER — MORPHINE SULFATE 2 MG/ML
2 INJECTION, SOLUTION INTRAMUSCULAR; INTRAVENOUS
Status: DISCONTINUED | OUTPATIENT
Start: 2024-08-26 | End: 2024-08-26

## 2024-08-26 RX ORDER — ALBUTEROL SULFATE 0.83 MG/ML
2.5 SOLUTION RESPIRATORY (INHALATION) EVERY 6 HOURS PRN
Status: DISCONTINUED | OUTPATIENT
Start: 2024-08-26 | End: 2024-08-28 | Stop reason: HOSPADM

## 2024-08-26 RX ORDER — POTASSIUM CHLORIDE 750 MG/1
40 TABLET, FILM COATED, EXTENDED RELEASE ORAL EVERY 4 HOURS
Status: DISCONTINUED | OUTPATIENT
Start: 2024-08-26 | End: 2024-08-26

## 2024-08-26 RX ORDER — BUDESONIDE AND FORMOTEROL FUMARATE DIHYDRATE 160; 4.5 UG/1; UG/1
2 AEROSOL RESPIRATORY (INHALATION)
Status: DISCONTINUED | OUTPATIENT
Start: 2024-08-26 | End: 2024-08-28 | Stop reason: HOSPADM

## 2024-08-26 RX ORDER — POTASSIUM CHLORIDE 7.45 MG/ML
10 INJECTION INTRAVENOUS
Status: COMPLETED | OUTPATIENT
Start: 2024-08-26 | End: 2024-08-27

## 2024-08-26 RX ORDER — AMLODIPINE BESYLATE 5 MG/1
5 TABLET ORAL NIGHTLY
Status: DISCONTINUED | OUTPATIENT
Start: 2024-08-26 | End: 2024-08-27

## 2024-08-26 RX ORDER — SODIUM CHLORIDE 9 MG/ML
75 INJECTION, SOLUTION INTRAVENOUS CONTINUOUS
Status: DISCONTINUED | OUTPATIENT
Start: 2024-08-26 | End: 2024-08-28 | Stop reason: HOSPADM

## 2024-08-26 RX ORDER — MORPHINE SULFATE 2 MG/ML
2 INJECTION, SOLUTION INTRAMUSCULAR; INTRAVENOUS EVERY 4 HOURS PRN
Status: DISCONTINUED | OUTPATIENT
Start: 2024-08-26 | End: 2024-08-28 | Stop reason: HOSPADM

## 2024-08-26 RX ORDER — MONTELUKAST SODIUM 10 MG/1
10 TABLET ORAL NIGHTLY
Status: DISCONTINUED | OUTPATIENT
Start: 2024-08-26 | End: 2024-08-28 | Stop reason: HOSPADM

## 2024-08-26 RX ADMIN — BUDESONIDE AND FORMOTEROL FUMARATE DIHYDRATE 2 PUFF: 160; 4.5 AEROSOL RESPIRATORY (INHALATION) at 08:31

## 2024-08-26 RX ADMIN — POLYETHYLENE GLYCOL 3350 0.5 BOTTLE: 17 POWDER, FOR SOLUTION ORAL at 17:01

## 2024-08-26 RX ADMIN — PANTOPRAZOLE SODIUM 40 MG: 40 INJECTION, POWDER, FOR SOLUTION INTRAVENOUS at 09:31

## 2024-08-26 RX ADMIN — MORPHINE SULFATE 2 MG: 2 INJECTION, SOLUTION INTRAMUSCULAR; INTRAVENOUS at 21:06

## 2024-08-26 RX ADMIN — PROCHLORPERAZINE EDISYLATE 5 MG: 5 INJECTION INTRAMUSCULAR; INTRAVENOUS at 20:42

## 2024-08-26 RX ADMIN — PANTOPRAZOLE SODIUM 40 MG: 40 INJECTION, POWDER, FOR SOLUTION INTRAVENOUS at 20:42

## 2024-08-26 RX ADMIN — SODIUM CHLORIDE 75 ML/HR: 9 INJECTION, SOLUTION INTRAVENOUS at 17:15

## 2024-08-26 RX ADMIN — ONDANSETRON 4 MG: 2 INJECTION INTRAMUSCULAR; INTRAVENOUS at 09:55

## 2024-08-26 RX ADMIN — CETIRIZINE HYDROCHLORIDE 10 MG: 10 TABLET ORAL at 20:42

## 2024-08-26 RX ADMIN — ONDANSETRON 4 MG: 2 INJECTION INTRAMUSCULAR; INTRAVENOUS at 03:22

## 2024-08-26 RX ADMIN — MORPHINE SULFATE 2 MG: 2 INJECTION, SOLUTION INTRAMUSCULAR; INTRAVENOUS at 00:48

## 2024-08-26 RX ADMIN — MONTELUKAST SODIUM 10 MG: 10 TABLET, FILM COATED ORAL at 20:42

## 2024-08-26 RX ADMIN — SODIUM CHLORIDE 75 ML/HR: 9 INJECTION, SOLUTION INTRAVENOUS at 03:22

## 2024-08-26 RX ADMIN — AMLODIPINE BESYLATE 5 MG: 5 TABLET ORAL at 20:42

## 2024-08-26 RX ADMIN — SIMETHICONE 80 MG: 80 TABLET, CHEWABLE ORAL at 17:01

## 2024-08-26 RX ADMIN — POTASSIUM CHLORIDE 10 MEQ: 7.46 INJECTION, SOLUTION INTRAVENOUS at 20:42

## 2024-08-26 RX ADMIN — POTASSIUM CHLORIDE 10 MEQ: 7.46 INJECTION, SOLUTION INTRAVENOUS at 21:55

## 2024-08-26 RX ADMIN — ONDANSETRON 4 MG: 2 INJECTION INTRAMUSCULAR; INTRAVENOUS at 17:15

## 2024-08-26 RX ADMIN — POTASSIUM CHLORIDE 10 MEQ: 7.46 INJECTION, SOLUTION INTRAVENOUS at 22:50

## 2024-08-26 NOTE — DISCHARGE PLACEMENT REQUEST
"Armando Wick (69 y.o. Female)       Date of Birth   1955    Social Security Number       Address   [address] [place] [address]    Home Phone   [phone]    MRN   [id number]       Scientology   Patient Refused    Marital Status                               Admission Date   8/25/24    Admission Type   Emergency    Admitting Provider   Daniel Ayers MD    Attending Provider   Daniel Ayers MD    Department, Room/Bed   [address], [address]       Discharge Date       Discharge Disposition       Discharge Destination                                 Attending Provider: Daniel Ayers MD    Allergies: Penicillins    Isolation: None   Infection: None   Code Status: CPR    Ht: 121.9 cm (48\")   Wt: 49.9 kg (110 lb)    Admission Cmt: None   Principal Problem: Colitis [K52.9]                   Active Insurance as of 8/25/2024       Primary Coverage       Payor Plan Insurance Group Employer/Plan Group    [employer] [place] [employer] [place]        Payor Plan Address Payor Plan Phone Number Payor Plan Fax Number Effective Dates     [address] [address] [phone]  5/31/2024 - None Entered    [facility] [id number]         Subscriber Name Subscriber Birth Date Member ID       ARMANDO WICK [id number]                     Emergency Contacts        (Rel.) Home Phone Work Phone Mobile Phone    BALAJI BARBOSALORETTA (Daughter) [phone] -- [phone]                "
yes

## 2024-08-26 NOTE — ED NOTES
Pt ambulated to bathroom with steady ambulation, daughter at her side. After bowel movement, daughter called for assistance-pt on toilet, clammy, diaphoretic and only verbalizing mumbles at this time. Pt placed in wheelchair and taken back to room. MD Gates at the bedside at this time.

## 2024-08-26 NOTE — PLAN OF CARE
Problem: Adult Inpatient Plan of Care  Goal: Absence of Hospital-Acquired Illness or Injury  Outcome: Ongoing, Progressing  Intervention: Identify and Manage Fall Risk  Recent Flowsheet Documentation  Taken 8/26/2024 0600 by Sivakumar Starr RN  Safety Promotion/Fall Prevention:   safety round/check completed   room organization consistent   nonskid shoes/slippers when out of bed   lighting adjusted   fall prevention program maintained   clutter free environment maintained   assistive device/personal items within reach  Taken 8/26/2024 0400 by Sivakumar Starr RN  Safety Promotion/Fall Prevention:   safety round/check completed   room organization consistent   nonskid shoes/slippers when out of bed   lighting adjusted   fall prevention program maintained   clutter free environment maintained   assistive device/personal items within reach  Taken 8/26/2024 0236 by Sivakumar Starr RN  Safety Promotion/Fall Prevention:   safety round/check completed   room organization consistent   nonskid shoes/slippers when out of bed   lighting adjusted   fall prevention program maintained   clutter free environment maintained   assistive device/personal items within reach  Taken 8/26/2024 0048 by Sivakumar Starr RN  Safety Promotion/Fall Prevention:   safety round/check completed   room organization consistent   nonskid shoes/slippers when out of bed   lighting adjusted   fall prevention program maintained   clutter free environment maintained   assistive device/personal items within reach  Taken 8/26/2024 0031 by Sivakumar Starr, RN  Safety Promotion/Fall Prevention:   safety round/check completed   room organization consistent   nonskid shoes/slippers when out of bed   lighting adjusted   fall prevention program maintained   clutter free environment maintained   assistive device/personal items within reach  Intervention: Prevent Skin Injury  Recent Flowsheet Documentation  Taken  8/26/2024 0600 by Sivakumar Starr RN  Body Position: position changed independently  Taken 8/26/2024 0400 by Sivakumar Starr RN  Body Position: position changed independently  Taken 8/26/2024 0236 by Sivakumar Starr RN  Body Position: position changed independently  Taken 8/26/2024 0048 by Sivakumar Starr RN  Body Position: position changed independently  Taken 8/26/2024 0031 by Sivakumar Starr RN  Body Position: position changed independently  Intervention: Prevent and Manage VTE (Venous Thromboembolism) Risk  Recent Flowsheet Documentation  Taken 8/26/2024 0600 by Sivakumar Starr RN  Activity Management: bedrest  Taken 8/26/2024 0400 by Sivakuamr Starr RN  Activity Management: bedrest  Taken 8/26/2024 0236 by Sivakumar Starr RN  Activity Management: bedrest  Taken 8/26/2024 0048 by Sivakumar Starr RN  Activity Management: bedrest  Taken 8/26/2024 0031 by Sivakumar Starr RN  Activity Management: bedrest  Range of Motion: active ROM (range of motion) encouraged  Intervention: Prevent Infection  Recent Flowsheet Documentation  Taken 8/26/2024 0600 by Sivakumar Starr RN  Infection Prevention:   single patient room provided   rest/sleep promoted   personal protective equipment utilized   hand hygiene promoted   environmental surveillance performed  Taken 8/26/2024 0400 by Sivakumar Starr RN  Infection Prevention:   single patient room provided   rest/sleep promoted   personal protective equipment utilized   hand hygiene promoted   environmental surveillance performed  Taken 8/26/2024 0236 by Sivakumar Starr RN  Infection Prevention:   single patient room provided   rest/sleep promoted   personal protective equipment utilized   hand hygiene promoted   environmental surveillance performed  Taken 8/26/2024 0048 by Sivakumar Starr RN  Infection Prevention:   single patient room provided   rest/sleep  promoted   personal protective equipment utilized   hand hygiene promoted   environmental surveillance performed  Taken 8/26/2024 0031 by Sivakumar Starr RN  Infection Prevention:   single patient room provided   rest/sleep promoted   personal protective equipment utilized   hand hygiene promoted   environmental surveillance performed   Goal Outcome Evaluation:  Plan of Care Reviewed With: patient           Outcome Evaluation: AOx4, VSS, SR on telemetry, room air, ongoing IV fluid infusion, c/o nausea and pain -PRN medications given, stool exam done, noted blood on stool, daughter at bedside, bed alarm on, call light within reach.

## 2024-08-26 NOTE — ED NOTES
Pt's PIV extravasated after contrast infusion. Pt is CT at this time-initial contrast test-had normal flow per CT tech. PIV removed, Warm compress applied. Kimberly TEIXEIRA notified at this time.

## 2024-08-26 NOTE — CONSULTS
[facility]  Initial Inpatient Consult Note    Referring Provider: Rosa Li APRN (     Reason for Consultation: GI Bleed, colitis     Subjective     History of present illness:    69 y.o. female with a history of hypertension, asthma, GERD who presented to the ED with complaints of abdominal pain, nausea and vomiting.    Patient seen this morning.  She reports that she has not been feeling well for about 2 months-having generalized fatigue, poor appetite and intermittent abdominal discomfort.  She had a D&C 3 days ago for endometrial mass and says since then she has had worsening abdominal cramping.  She also had some diarrhea-she denies any black or bloody stool.  She had 1 episode of nonbloody emesis.  She denies fever/chills.  No unintentional weight loss.    CT abdomen pelvis showed mild pericolonic fat stranding with liquefied stool within portions of the colon concerning for nonspecific colitis.  Additionally sigmoid colon and rectum are moderately distended with a air, inspissated stool and liquefied stool which suggest some degree of constipation distally.    C. difficile and GI PCR were both negative.    Past Medical History:  Past Medical History:   Diagnosis Date    Acid reflux     Asthma     Hypertension     Pelvic pain     PMB (postmenopausal bleeding)      Past Surgical History:  Past Surgical History:   Procedure Laterality Date    D & C HYSTEROSCOPY N/A 8/22/2024    Procedure: DILATATION AND CURETTAGE HYSTEROSCOPY WITH MYOSURE;  Surgeon: Shamar Reyes MD;  Location: [facility];  Service: Obstetrics/Gynecology;  Laterality: N/A;    NO PAST SURGERIES        Social History:   Social History     Tobacco Use    Smoking status: Never     Passive exposure: Never    Smokeless tobacco: Never   Substance Use Topics    Alcohol use: Not Currently      Family History:  Family History   Problem Relation Age of Onset    Hypertension Mother     Hypertension Father      Breast cancer Neg Hx     Ovarian cancer Neg Hx     Uterine cancer Neg Hx     Colon cancer Neg Hx     Malig Hyperthermia Neg Hx        Home Meds:  Medications Prior to Admission   Medication Sig Dispense Refill Last Dose    albuterol sulfate  (90 Base) MCG/ACT inhaler Inhale 2 puffs Every 4 (Four) Hours As Needed for Shortness of Air.   Past Month    cetirizine (zyrTEC) 10 MG tablet Take 1 tablet by mouth every night at bedtime.   8/25/2024 at 1100    enalapril (VASOTEC) 20 MG tablet Take 0.5 tablets by mouth Daily.   8/25/2024 at 1100    Fluticasone-Salmeterol (ADVAIR/WIXELA) 250-50 MCG/ACT DISKUS Inhale 1 puff Daily.   8/25/2024 at 1000    HYDROcodone-acetaminophen (Norco) 5-325 MG per tablet Take 1 tablet by mouth Every 6 (Six) Hours As Needed for Severe Pain. 5 tablet 0 Past Week    MAGNESIUM PO Take 1 tablet by mouth Daily.   Past Week    amLODIPine (NORVASC) 10 MG tablet Take 0.5 tablets by mouth Every Night.   8/24/2024 at 2200    hydroCHLOROthiazide 25 MG tablet Take 1 tablet by mouth Daily.   8/24/2024 at 1100    ibuprofen (ADVIL,MOTRIN) 600 MG tablet Take 1 tablet by mouth Every 6 (Six) Hours As Needed for Moderate Pain. 30 tablet 1     montelukast (SINGULAIR) 10 MG tablet Take 1 tablet by mouth Every Night.   8/24/2024 at 2200    UNKNOWN TO PATIENT Inhale 1 puff Daily. INSTRUCTED TO BRING DAY OF SURGERY        Current Meds:   amLODIPine, 5 mg, Oral, Nightly  budesonide-formoterol, 2 puff, Inhalation, BID - RT  cetirizine, 10 mg, Oral, Nightly  montelukast, 10 mg, Oral, Nightly  pantoprazole, 40 mg, Intravenous, Q12H      Allergies:  Allergies   Allergen Reactions    Penicillins Rash     Objective     Vital Signs  Temp:  [98 °F (36.7 °C)-98.6 °F (37 °C)] 98.6 °F (37 °C)  Heart Rate:  [70-97] 84  Resp:  [16-18] 18  BP: ()/(55-84) 118/60    Physical Exam:   General: patient awake, alert and cooperative    Cardiovascular: regular rhythm and rate   Pulm: clear to auscultation bilaterally, regular  and unlabored   Abdomen: soft, nontender, nondistended; normal bowel sounds    Results Review:   I reviewed the patient's new clinical results.    Results from last 7 days   Lab Units 08/26/24  0558 08/25/24 2021 08/20/24  1410   WBC 10*3/mm3 13.84* 14.77* 9.28   HEMOGLOBIN g/dL 12.8  12.8 11.9* 14.7   HEMATOCRIT % 38.5  38.5 35.7 43.7   PLATELETS 10*3/mm3 216 213 310     Results from last 7 days   Lab Units 08/26/24  0558 08/25/24 2021 08/20/24  1410   SODIUM mmol/L 136 138 137   POTASSIUM mmol/L 3.7 3.5 4.3   CHLORIDE mmol/L 102 103 98   CO2 mmol/L 24.2 22.3 27.3   BUN mg/dL 16 19 12   CREATININE mg/dL 0.89 1.32* 0.82   CALCIUM mg/dL 9.4 9.4 10.2   BILIRUBIN mg/dL  --  0.2  --    ALK PHOS U/L  --  72  --    ALT (SGPT) U/L  --  14  --    AST (SGOT) U/L  --  17  --    GLUCOSE mg/dL 133* 119* 111*         Lab Results   Lab Value Date/Time    LIPASE 103 (H) 08/25/2024 2021         Radiology:  CT Abdomen Pelvis With Contrast   Final Result   COMBINED IMPRESSION:       1.  No definite acute PE, as above.   2.  Mild pericolonic fat stranding with liquefied stool within portions   of the colon, concerning for a nonspecific colitis in the appropriate   clinical setting. Additionally, the distal sigmoid colon and rectum are   moderately distended with air, inspissated stool, and liquefied stool,   which suggests some degree of distal constipation.   3.  Tiny hiatal hernia.               This report was finalized on 8/25/2024 9:18 PM by Dr. Alexia Watson M.D   on Workstation: [id number]          CT Angiogram Chest   Final Result   COMBINED IMPRESSION:       1.  No definite acute PE, as above.   2.  Mild pericolonic fat stranding with liquefied stool within portions   of the colon, concerning for a nonspecific colitis in the appropriate   clinical setting. Additionally, the distal sigmoid colon and rectum are   moderately distended with air, inspissated stool, and liquefied stool,   which suggests some degree of distal  constipation.   3.  Tiny hiatal hernia.               This report was finalized on 8/25/2024 9:18 PM by Dr. Alexia Watson M.D   on Workstation: [id number]          CT Head Without Contrast   Final Result   1.  No acute intracranial hemorrhage. Mild small vessel ischemic   disease. If there is clinical concern for acute infarction, this would   be better assessed with MRI.   2.  No acute cervical spine fracture.       This report was finalized on 8/25/2024 9:08 PM by Dr. Alexia Watson M.D   on Workstation: [id number]          CT Cervical Spine Without Contrast   Final Result   1.  No acute intracranial hemorrhage. Mild small vessel ischemic   disease. If there is clinical concern for acute infarction, this would   be better assessed with MRI.   2.  No acute cervical spine fracture.       This report was finalized on 8/25/2024 9:08 PM by Dr. Alexia Watson M.D   on Workstation: [id number]              Assessment & Plan   Active Hospital Problems    Diagnosis     **Colitis     Dizziness     Asthma     Hypertension     Abdominal pain        Assessment:  Colitis  Malaise  Abdominal discomfort    Plan:  Recommend proceeding with colonoscopy due to findings of colitis on CT scan.  Discussed with patient this morning he was agreeable  Clear liquid diet today  N.p.o. after midnight  Continue PPI given vomiting    Patient plan of care discussed with attending, Dr. Vidhi Yarbrough PA-C

## 2024-08-26 NOTE — PAYOR COMM NOTE
"Armando Wick (69 y.o. Female)        PLEASE SEE ATTACHED FOR OBSERVATION AUTH.     PLEASE CALL   OR  [phone]    THANK YOU    YVONNE DALTON LPN CCP   Date of Birth   1955    Social Security Number       Address   [address] [place] [address]    Home Phone   [phone]    MRN   [id number]       Catholic   Patient Refused    Marital Status                               Admission Date   8/25/24    Admission Type   Emergency    Admitting Provider   Daniel Ayers MD    Attending Provider   Daniel Ayers MD    Department, Room/Bed   [address], [address]/1       Discharge Date       Discharge Disposition       Discharge Destination                                 Attending Provider: Daniel Ayers MD    Allergies: Penicillins    Isolation: Spore   Infection: C.difficile (rule out) (08/25/24)   Code Status: CPR    Ht: 121.9 cm (48\")   Wt: 49.9 kg (110 lb)    Admission Cmt: None   Principal Problem: Colitis [K52.9]                   Active Insurance as of 8/25/2024       Primary Coverage       Payor Plan Insurance Group Employer/Plan Group    [employer] [place] [employer] [place]ICA[place]        Payor Plan Address Payor Plan Phone Number Payor Plan Fax Number Effective Dates    [address] [address] [phone]  5/31/2024 - None Entered    [facility] [id number]         Subscriber Name Subscriber Birth Date Member ID       ARMANDO WICK [id number]                     Emergency Contacts        (Rel.) Home Phone Work Phone Mobile Phone    JANY BARBOSA (Daughter) [phone] -- [phone]              [place]: NPI [id number]  Tax ID [id number]     History & Physical        Rosa Li APRN at 08/26/24 0118                Patient Name:  Armando Maier  YOB: 1955  MRN:  [id number]  Admit Date:  8/25/2024  Patient Care Team:  Provider, No Known as PCP - " General      Subjective  History Present Illness     Chief Complaint   Patient presents with    Abdominal Pain    Back Pain       Ms. Karo Maier is a 69 y.o. non-smoker with a history of hypertension, asthma, and GERD that presents to [facility] complaining of abdominal pain, nausea, and vomiting. She reports she had a D&C 3 days ago for an endometrial mass. She reports some worsening abdominal pain that is described as intermittent, moderate, and cramping in nature.  She states she initially had some constipation but now she is having some diarrhea and her stools are dark in color. She also reports stool incontinence twice today.  She states she has been taking Ibuprofen every 7 hours since her D&C for pain. She reports  nausea and one episode of non-bloody emesis yesterday. She also reports some vaginal spotting but denies a large amount of vaginal blood.  She denies chest pain, shortness of breath, fever, and chills. She reports dizziness that has been ongoing for months but has worsened in the past few days. Per the ED provider, she had a near syncopal episode on the toilet in the ED. Work up revealed creatinine 1.32 and WBC 14.77. A CT of the head showed mild small vessel disease but was negative for an acute intracranial process. A CT angiogram of the chest was negative for an acute pulmonary embolism. A CT of the abdomen/pelvis showed mild pericolonic fat stranding with liquefied stool within portions of the colon, concerning for nonspecific colitis. Additionally, the distal sigmoid colon and rectum are moderately distended with air, inspissated stool, and liquefied stool, which suggests some degree of distal constipation. She has been admitted for further evaluation.        History of Present Illness  Review of Systems   Constitutional:  Positive for fatigue. Negative for chills and fever.   HENT:  Negative for congestion and sore throat.    Eyes:  Negative for photophobia and visual  disturbance.   Respiratory:  Negative for cough, shortness of breath and wheezing.    Cardiovascular:  Negative for chest pain, palpitations and leg swelling.   Gastrointestinal:  Positive for abdominal pain, constipation, diarrhea, nausea and vomiting.   Genitourinary:  Negative for difficulty urinating, dysuria, flank pain, frequency, hematuria, pelvic pain and urgency.   Musculoskeletal:  Positive for myalgias. Negative for arthralgias.   Skin:  Negative for color change and wound.   Neurological:  Positive for dizziness and light-headedness. Negative for seizures, facial asymmetry, speech difficulty, weakness, numbness and headaches.        Personal History     Past Medical History:   Diagnosis Date    Acid reflux     Asthma     Hypertension     Pelvic pain     PMB (postmenopausal bleeding)      Past Surgical History:   Procedure Laterality Date    D & C HYSTEROSCOPY N/A 8/22/2024    Procedure: DILATATION AND CURETTAGE HYSTEROSCOPY WITH MYOSURE;  Surgeon: Shamar Reyes MD;  Location: [facility];  Service: Obstetrics/Gynecology;  Laterality: N/A;    NO PAST SURGERIES       Family History   Problem Relation Age of Onset    Hypertension Mother     Hypertension Father     Breast cancer Neg Hx     Ovarian cancer Neg Hx     Uterine cancer Neg Hx     Colon cancer Neg Hx     Malig Hyperthermia Neg Hx      Social History     Tobacco Use    Smoking status: Never     Passive exposure: Never    Smokeless tobacco: Never   Vaping Use    Vaping status: Never Used   Substance Use Topics    Alcohol use: Not Currently    Drug use: Never     Medications Prior to Admission   Medication Sig Dispense Refill Last Dose    albuterol sulfate  (90 Base) MCG/ACT inhaler Inhale 2 puffs Every 4 (Four) Hours As Needed for Shortness of Air.   Past Month    cetirizine (zyrTEC) 10 MG tablet Take 1 tablet by mouth every night at bedtime.   8/25/2024 at 1100    enalapril (VASOTEC) 20 MG tablet Take 0.5 tablets by mouth Daily.    8/25/2024 at 1100    Fluticasone-Salmeterol (ADVAIR/WIXELA) 250-50 MCG/ACT DISKUS Inhale 1 puff Daily.   8/25/2024 at 1000    HYDROcodone-acetaminophen (Norco) 5-325 MG per tablet Take 1 tablet by mouth Every 6 (Six) Hours As Needed for Severe Pain. 5 tablet 0 Past Week    MAGNESIUM PO Take 1 tablet by mouth Daily.   Past Week    amLODIPine (NORVASC) 10 MG tablet Take 0.5 tablets by mouth Every Night.   8/24/2024 at 2200    hydroCHLOROthiazide 25 MG tablet Take 1 tablet by mouth Daily.   8/24/2024 at 1100    ibuprofen (ADVIL,MOTRIN) 600 MG tablet Take 1 tablet by mouth Every 6 (Six) Hours As Needed for Moderate Pain. 30 tablet 1     montelukast (SINGULAIR) 10 MG tablet Take 1 tablet by mouth Every Night.   8/24/2024 at 2200    UNKNOWN TO PATIENT Inhale 1 puff Daily. INSTRUCTED TO BRING DAY OF SURGERY        Allergies:    Allergies   Allergen Reactions    Penicillins Rash       Objective   Objective     Vital Signs  Temp:  [98 °F (36.7 °C)-98.3 °F (36.8 °C)] 98 °F (36.7 °C)  Heart Rate:  [70-88] 86  Resp:  [16-18] 18  BP: ()/(55-84) 125/73  SpO2:  [94 %-99 %] 99 %  on   ;   Device (Oxygen Therapy): room air  Body mass index is 33.57 kg/m².    Physical Exam  Vitals and nursing note reviewed.   Constitutional:       Appearance: She is ill-appearing.   HENT:      Head: Normocephalic and atraumatic.      Nose: Nose normal.      Mouth/Throat:      Mouth: Mucous membranes are moist.      Pharynx: Oropharynx is clear.   Eyes:      Extraocular Movements: Extraocular movements intact.      Conjunctiva/sclera: Conjunctivae normal.   Cardiovascular:      Rate and Rhythm: Normal rate and regular rhythm.      Pulses: Normal pulses.      Heart sounds: Normal heart sounds.   Pulmonary:      Effort: Pulmonary effort is normal.      Breath sounds: Normal breath sounds.   Abdominal:      General: Bowel sounds are normal. There is no distension.      Palpations: Abdomen is soft. There is no mass.      Tenderness: There is  abdominal tenderness. There is no guarding or rebound.      Hernia: No hernia is present.   Musculoskeletal:         General: No swelling. Normal range of motion.      Cervical back: Normal range of motion and neck supple.   Skin:     General: Skin is warm and dry.   Neurological:      General: No focal deficit present.      Mental Status: She is alert and oriented to person, place, and time.   Psychiatric:         Mood and Affect: Mood normal.         Behavior: Behavior normal.         Results Review:  I reviewed the patient's new clinical results.  I reviewed the patient's new imaging results and agree with the interpretation.  I reviewed the patient's other test results and agree with the interpretation  I personally viewed and interpreted the patient's EKG/Telemetry data  Discussed with ED provider.    Lab Results (last 24 hours)       Procedure Component Value Units Date/Time    CBC & Differential [[id number]]  (Abnormal) Collected: 08/25/24 2021    Specimen: Blood Updated: 08/25/24 2045    Narrative:      The following orders were created for panel order CBC & Differential.  Procedure                               Abnormality         Status                     ---------                               -----------         ------                     CBC Auto Differential[[id number]]        Abnormal            Final result                 Please view results for these tests on the individual orders.    Comprehensive Metabolic Panel [[id number]]  (Abnormal) Collected: 08/25/24 2021    Specimen: Blood Updated: 08/25/24 2108     Glucose 119 mg/dL      BUN 19 mg/dL      Creatinine 1.32 mg/dL      Sodium 138 mmol/L      Potassium 3.5 mmol/L      Chloride 103 mmol/L      CO2 22.3 mmol/L      Calcium 9.4 mg/dL      Total Protein 5.7 g/dL      Albumin 3.5 g/dL      ALT (SGPT) 14 U/L      AST (SGOT) 17 U/L      Alkaline Phosphatase 72 U/L      Total Bilirubin 0.2 mg/dL      Globulin 2.2 gm/dL      A/G Ratio 1.6 g/dL       BUN/Creatinine Ratio 14.4     Anion Gap 12.7 mmol/L      eGFR 44.1 mL/min/1.73     Narrative:      GFR Normal >60  Chronic Kidney Disease <60  Kidney Failure <15      CBC Auto Differential [[id number]]  (Abnormal) Collected: 08/25/24 2021    Specimen: Blood Updated: 08/25/24 2045     WBC 14.77 10*3/mm3      RBC 4.08 10*6/mm3      Hemoglobin 11.9 g/dL      Hematocrit 35.7 %      MCV 87.5 fL      MCH 29.2 pg      MCHC 33.3 g/dL      RDW 12.4 %      RDW-SD 39.8 fl      MPV 9.7 fL      Platelets 213 10*3/mm3      Neutrophil % 61.5 %      Lymphocyte % 31.6 %      Monocyte % 5.6 %      Eosinophil % 0.5 %      Basophil % 0.4 %      Immature Grans % 0.4 %      Neutrophils, Absolute 9.08 10*3/mm3      Lymphocytes, Absolute 4.66 10*3/mm3      Monocytes, Absolute 0.83 10*3/mm3      Eosinophils, Absolute 0.08 10*3/mm3      Basophils, Absolute 0.06 10*3/mm3      Immature Grans, Absolute 0.06 10*3/mm3      nRBC 0.0 /100 WBC     Lipase [[id number]]  (Abnormal) Collected: 08/25/24 2021    Specimen: Blood Updated: 08/25/24 2106     Lipase 103 U/L     Single High Sensitivity Troponin T [[id number]]  (Normal) Collected: 08/25/24 2021    Specimen: Blood Updated: 08/25/24 2106     HS Troponin T 11 ng/L     Narrative:      High Sensitive Troponin T Reference Range:  <14.0 ng/L- Negative Female for AMI  <22.0 ng/L- Negative Male for AMI  >=14 - Abnormal Female indicating possible myocardial injury.  >=22 - Abnormal Male indicating possible myocardial injury.   Clinicians would have to utilize clinical acumen, EKG, Troponin, and serial changes to determine if it is an Acute Myocardial Infarction or myocardial injury due to an underlying chronic condition.         Urinalysis With Microscopic If Indicated (No Culture) - Urine, Clean Catch [[id number]]  (Abnormal) Collected: 08/25/24 2056    Specimen: Urine, Clean Catch Updated: 08/25/24 2138     Color, UA Yellow     Appearance, UA Clear     pH, UA 7.0     Specific Gravity, UA 1.014     Glucose,  UA Negative     Ketones, UA Negative     Bilirubin, UA Negative     Blood, UA Moderate (2+)     Protein, UA Trace     Leuk Esterase, UA Trace     Nitrite, UA Negative     Urobilinogen, UA 1.0 E.U./dL    Urinalysis, Microscopic Only - Urine, Clean Catch [[id number]]  (Abnormal) Collected: 08/25/24 2056    Specimen: Urine, Clean Catch Updated: 08/25/24 2138     RBC, UA 21-50 /HPF      WBC, UA 3-5 /HPF      Bacteria, UA None Seen /HPF      Squamous Epithelial Cells, UA 3-6 /HPF      Hyaline Casts, UA 21-30 /LPF      Methodology Automated Microscopy    Gastrointestinal Panel, PCR - Stool, Per Rectum [[id number]] Collected: 08/26/24 0231    Specimen: Stool from Per Rectum Updated: 08/26/24 0239    Clostridioides difficile Toxin - Stool, Per Rectum [[id number]] Collected: 08/26/24 0231    Specimen: Stool from Per Rectum Updated: 08/26/24 0239    Narrative:      The following orders were created for panel order Clostridioides difficile Toxin - Stool, Per Rectum.  Procedure                               Abnormality         Status                     ---------                               -----------         ------                     Clostridioides difficile...[[id number]]                      In process                   Please view results for these tests on the individual orders.    Clostridioides difficile Toxin, PCR - Stool, Per Rectum [[id number]] Collected: 08/26/24 0231    Specimen: Stool from Per Rectum Updated: 08/26/24 0239            Imaging Results (Last 24 Hours)       Procedure Component Value Units Date/Time    CT Abdomen Pelvis With Contrast [[id number]] Collected: 08/25/24 2108     Updated: 08/25/24 2122    Narrative:      CT ANGIOGRAM CHEST-, CT ABDOMEN PELVIS W CONTRAST-     HISTORY: Acute nausea, lightheadedness, generalized fatigue. Vague  abdominal pain. Recent D&C 3 days ago for endometrial mass. Some  vaginal spotting.     TECHNIQUE: Contrast-enhanced CT of the chest was performed using a PE  protocol.  CT of the abdomen and pelvis was performed following  administration of intravenous contrast. Reformatted, 3D, and MIP images  were reviewed. Radiation dose reduction techniques were utilized,  including automated exposure control and exposure modulation based on  body size.     COMPARISON: Pelvic ultrasound 8/16/2024     FINDINGS CHEST:       Evaluation of the pulmonary artery vasculature is somewhat limited due  to streak artifact from the patient's arms, which are located at her  side. There is also poor opacification of distal branches. When  accounting for the limitations, no definite acute pulmonary embolism is  identified to the proximal segmental pulmonary artery level.  No pathologically enlarged thoracic lymph nodes are identified.  Heart size is normal. There is no pericardial effusion. Great vessels  are normal in caliber.The pleural spaces are clear.  There is degenerative disc disease.  The trachea is clear. There is no focal consolidation.     FINDINGS ABDOMEN/PELVIS:     The liver is normal in size. No suspicious focal intrahepatic lesion is  identified. The gallbladder is present. The spleen is normal in size.  The pancreas is within normal limits. The adrenal glands are within  normal limits. The kidneys are within normal limits.  No pathologically enlarged abdominal or pelvic lymph nodes are  identified. There is moderate to advanced calcific atherosclerosis.  There is a tiny hiatal hernia. There is no small bowel obstruction. The  appendix is visualized. There is colonic diverticulosis. There is mild  pericolonic fat stranding, along with liquefied stool within portions of  the colon. The distal sigmoid colon and rectum are moderately distended  with air, inspissated stool, and liquefied stool. The bladder is poorly  distended and not well assessed. The uterus is present, poorly assessed  on CT.  There is degenerative disc disease.          Impression:      COMBINED IMPRESSION:     1.  No  definite acute PE, as above.  2.  Mild pericolonic fat stranding with liquefied stool within portions  of the colon, concerning for a nonspecific colitis in the appropriate  clinical setting. Additionally, the distal sigmoid colon and rectum are  moderately distended with air, inspissated stool, and liquefied stool,  which suggests some degree of distal constipation.  3.  Tiny hiatal hernia.           This report was finalized on 8/25/2024 9:18 PM by Dr. Alexia Watson M.D  on Workstation: [id number]       CT Angiogram Chest [[id number]] Collected: 08/25/24 2108     Updated: 08/25/24 2122    Narrative:      CT ANGIOGRAM CHEST-, CT ABDOMEN PELVIS W CONTRAST-     HISTORY: Acute nausea, lightheadedness, generalized fatigue. Vague  abdominal pain. Recent D&C 3 days ago for endometrial mass. Some  vaginal spotting.     TECHNIQUE: Contrast-enhanced CT of the chest was performed using a PE  protocol. CT of the abdomen and pelvis was performed following  administration of intravenous contrast. Reformatted, 3D, and MIP images  were reviewed. Radiation dose reduction techniques were utilized,  including automated exposure control and exposure modulation based on  body size.     COMPARISON: Pelvic ultrasound 8/16/2024     FINDINGS CHEST:       Evaluation of the pulmonary artery vasculature is somewhat limited due  to streak artifact from the patient's arms, which are located at her  side. There is also poor opacification of distal branches. When  accounting for the limitations, no definite acute pulmonary embolism is  identified to the proximal segmental pulmonary artery level.  No pathologically enlarged thoracic lymph nodes are identified.  Heart size is normal. There is no pericardial effusion. Great vessels  are normal in caliber.The pleural spaces are clear.  There is degenerative disc disease.  The trachea is clear. There is no focal consolidation.     FINDINGS ABDOMEN/PELVIS:     The liver is normal in size. No  suspicious focal intrahepatic lesion is  identified. The gallbladder is present. The spleen is normal in size.  The pancreas is within normal limits. The adrenal glands are within  normal limits. The kidneys are within normal limits.  No pathologically enlarged abdominal or pelvic lymph nodes are  identified. There is moderate to advanced calcific atherosclerosis.  There is a tiny hiatal hernia. There is no small bowel obstruction. The  appendix is visualized. There is colonic diverticulosis. There is mild  pericolonic fat stranding, along with liquefied stool within portions of  the colon. The distal sigmoid colon and rectum are moderately distended  with air, inspissated stool, and liquefied stool. The bladder is poorly  distended and not well assessed. The uterus is present, poorly assessed  on CT.  There is degenerative disc disease.          Impression:      COMBINED IMPRESSION:     1.  No definite acute PE, as above.  2.  Mild pericolonic fat stranding with liquefied stool within portions  of the colon, concerning for a nonspecific colitis in the appropriate  clinical setting. Additionally, the distal sigmoid colon and rectum are  moderately distended with air, inspissated stool, and liquefied stool,  which suggests some degree of distal constipation.  3.  Tiny hiatal hernia.           This report was finalized on 8/25/2024 9:18 PM by Dr. Alexia Watson M.D  on Workstation: [id number]       CT Head Without Contrast [[id number]] Collected: 08/25/24 2100     Updated: 08/25/24 2111    Narrative:      EXAM:  CT HEAD WO CONTRAST-, CT CERVICAL SPINE WO CONTRAST-     HISTORY: Fall, acute nausea with lightheadedness. Fatigue.     COMPARISON: None.     TECHNIQUE: Noncontrast images of the brain and cervical spine were  obtained. Reformatted images were reviewed. Radiation dose reduction  techniques were utilized, including automated exposure control and  exposure modulation based on body size.     FINDINGS:        BRAIN:     The ventricles and sulci are within normal limits for patient age.  No  acute intracranial hemorrhage or pathologic extra-axial collection is  identified.  There is no midline shift or mass effect.  The basal  cisterns are patent. There is mild mineralization of bilateral basal  ganglia, likely age-related. There is mild small vessel ischemic  disease. The grey-white matter differentiation is maintained. There is  calcific intracranial atherosclerosis.     The calvarium is intact.  The mastoid air cells and visualized portions  of the paranasal sinuses are clear.     CERVICAL SPINE:     There is slight straightening of the normal cervical lordosis. There is  trace anterolisthesis of C7 on T1. Vertebral body heights are  maintained. There is mild disc height at multiple levels. There are  small degenerative endplate osteophytes at a few levels, along with  multilevel uncovertebral hypertrophy, endplate sclerosis, Schmorl's node  formation, and left greater than right facet osteoarthropathy. There is  mild spinal canal stenosis at C3-4 and C4-5. Neural foraminal stenosis  is most pronounced on the left at C3-4, where it is moderate.  No acute cervical spine fracture identified.          Impression:      1.  No acute intracranial hemorrhage. Mild small vessel ischemic  disease. If there is clinical concern for acute infarction, this would  be better assessed with MRI.  2.  No acute cervical spine fracture.     This report was finalized on 8/25/2024 9:08 PM by Dr. Alexia Watson M.D  on Workstation: [id number]       CT Cervical Spine Without Contrast [[id number]] Collected: 08/25/24 2100     Updated: 08/25/24 2111    Narrative:      EXAM:  CT HEAD WO CONTRAST-, CT CERVICAL SPINE WO CONTRAST-     HISTORY: Fall, acute nausea with lightheadedness. Fatigue.     COMPARISON: None.     TECHNIQUE: Noncontrast images of the brain and cervical spine were  obtained. Reformatted images were reviewed. Radiation dose  reduction  techniques were utilized, including automated exposure control and  exposure modulation based on body size.     FINDINGS:       BRAIN:     The ventricles and sulci are within normal limits for patient age.  No  acute intracranial hemorrhage or pathologic extra-axial collection is  identified.  There is no midline shift or mass effect.  The basal  cisterns are patent. There is mild mineralization of bilateral basal  ganglia, likely age-related. There is mild small vessel ischemic  disease. The grey-white matter differentiation is maintained. There is  calcific intracranial atherosclerosis.     The calvarium is intact.  The mastoid air cells and visualized portions  of the paranasal sinuses are clear.     CERVICAL SPINE:     There is slight straightening of the normal cervical lordosis. There is  trace anterolisthesis of C7 on T1. Vertebral body heights are  maintained. There is mild disc height at multiple levels. There are  small degenerative endplate osteophytes at a few levels, along with  multilevel uncovertebral hypertrophy, endplate sclerosis, Schmorl's node  formation, and left greater than right facet osteoarthropathy. There is  mild spinal canal stenosis at C3-4 and C4-5. Neural foraminal stenosis  is most pronounced on the left at C3-4, where it is moderate.  No acute cervical spine fracture identified.          Impression:      1.  No acute intracranial hemorrhage. Mild small vessel ischemic  disease. If there is clinical concern for acute infarction, this would  be better assessed with MRI.  2.  No acute cervical spine fracture.     This report was finalized on 8/25/2024 9:08 PM by Dr. Alexia Watson M.D  on Workstation: [id number]                   ECG 12 Lead Other; weakness   Final Result   HEART RATE=85  bpm   RR Jxkzfnwx=986  ms   PA Ynanilkq=797  ms   P Horizontal Axis=-1  deg   P Front Axis=11  deg   QRSD Interval=86  ms   QT Gtjindst=271  ms   CSyZ=245  ms   QRS Axis=-10  deg   T Wave  Axis=50  deg   - BORDERLINE ECG -   Sinus rhythm   Probable  left atrial enlargement   No change from previous tracing   Electronically Signed By: [facility]ryl [facility]) ([facility] 2024-08-25 22:00:27   Date and Time of Study:2024-08-25 20:59:28           Assessment/Plan     Active Hospital Problems    Diagnosis  POA    **Colitis [K52.9]  Unknown    Dizziness [R42]  Unknown    Asthma [J45.909]  Unknown    Hypertension [I10]  Unknown    Abdominal pain [R10.9]  Unknown       Colitis  -Admit to telemetry for monitoring  -GI consult  -Clear liquid diet  -Her hgb is currently stable. Monitor H&H q 6 H and transfuse for hgb less than 7  -PRN analgesia and antiemetics  -IV Protonix q 12 H  -She has some leukocytosis. C diff stool and GI PCR are pending, will await results before starting antibiotics  -CT A/P showed constipation. Will start a daily bowel regimen  -Her creatinine is up a bit. IVF overnight, repeat labs in AM    Dizziness  Near Syncope  -Near syncope was most likely a vasovagal reaction  -CT head was negative  -Check orthostatics  -Check echo    Asthma  -Her respiratory status is stable on room air  -Continue home inhalers    Hypertension  -Blood pressures have been stable. Continue amlodipine  -Hold HCTZ and enalapril for now secondary to elevated creatinine  -Monitor    -I discussed the patients findings and my recommendations with patient and family.    VTE Prophylaxis - SCDs.  Code Status - Full code.       CARMENZA Leo  [facility]  08/26/24  03:07 EDT      Electronically signed by Rosa Li APRN at 08/26/24 0311          Emergency Department Notes        Arnie Yin, RN at 08/25/24 2303          Nursing report ED to floor  [facility]  69 y.o.  female    HPI :  HPI (Adult)  Stated Reason for Visit: abd pain with EMS, back pain now at triage. reent poly removals from vagina area.  History Obtained From: EMS    Chief Complaint  Chief Complaint    Patient presents with    Abdominal Pain    Back Pain       Admitting doctor:   Ian Farrell MD    Admitting diagnosis:   The primary encounter diagnosis was Gastrointestinal hemorrhage, unspecified gastrointestinal hemorrhage type. A diagnosis of Vasovagal near syncope was also pertinent to this visit.    Code status:   Current Code Status       Date Active Code Status Order ID Comments User Context       Not on file            Allergies:   Penicillins    Isolation:   No active isolations    Intake and Output  No intake or output data in the 24 hours ending 08/25/24 2303    Weight:       08/25/24  1931   Weight: 49.9 kg (110 lb)       Most recent vitals:   Vitals:    08/25/24 2131 08/25/24 2154 08/25/24 2201 08/25/24 2231   BP: 102/55  106/56 97/58   Pulse: 78 88 79 74   Resp:       Temp:       TempSrc:       SpO2: 97% 99% 98% 98%   Weight:       Height:           Active LDAs/IV Access:   Lines, Drains & Airways       Active LDAs       Name Placement date Placement time Site Days    Peripheral IV 08/25/24 1941 Anterior;Left Hand 08/25/24 1941  Hand  less than 1    Peripheral IV 08/25/24 2026 Anterior;Right;Upper Arm 08/25/24 2026  Arm  less than 1                    Labs (abnormal labs have a star):   Labs Reviewed   COMPREHENSIVE METABOLIC PANEL - Abnormal; Notable for the following components:       Result Value    Glucose 119 (*)     Creatinine 1.32 (*)     Total Protein 5.7 (*)     eGFR 44.1 (*)     All other components within normal limits    Narrative:     GFR Normal >60  Chronic Kidney Disease <60  Kidney Failure <15     URINALYSIS W/ MICROSCOPIC IF INDICATED (NO CULTURE) - Abnormal; Notable for the following components:    Blood, UA Moderate (2+) (*)     Protein, UA Trace (*)     Leuk Esterase, UA Trace (*)     All other components within normal limits   CBC WITH AUTO DIFFERENTIAL - Abnormal; Notable for the following components:    WBC 14.77 (*)     Hemoglobin 11.9 (*)     Neutrophils, Absolute  9.08 (*)     Lymphocytes, Absolute 4.66 (*)     Immature Grans, Absolute 0.06 (*)     All other components within normal limits   LIPASE - Abnormal; Notable for the following components:    Lipase 103 (*)     All other components within normal limits   URINALYSIS, MICROSCOPIC ONLY - Abnormal; Notable for the following components:    RBC, UA 21-50 (*)     WBC, UA 3-5 (*)     Squamous Epithelial Cells, UA 3-6 (*)     All other components within normal limits   SINGLE HS TROPONIN T - Normal    Narrative:     High Sensitive Troponin T Reference Range:  <14.0 ng/L- Negative Female for AMI  <22.0 ng/L- Negative Male for AMI  >=14 - Abnormal Female indicating possible myocardial injury.  >=22 - Abnormal Male indicating possible myocardial injury.   Clinicians would have to utilize clinical acumen, EKG, Troponin, and serial changes to determine if it is an Acute Myocardial Infarction or myocardial injury due to an underlying chronic condition.        GASTROINTESTINAL PANEL, PCR (PREFERRED) DOES NOT INCLUDE CDIFF   CLOSTRIDIOIDES DIFFICILE TOXIN    Narrative:     The following orders were created for panel order Clostridioides difficile Toxin - Stool, Per Rectum.  Procedure                               Abnormality         Status                     ---------                               -----------         ------                     Clostridioides difficile...[[id number]]                                                   Please view results for these tests on the individual orders.   CLOSTRIDIOIDES DIFFICILE TOXIN, PCR   TYPE AND SCREEN   CBC AND DIFFERENTIAL    Narrative:     The following orders were created for panel order CBC & Differential.  Procedure                               Abnormality         Status                     ---------                               -----------         ------                     CBC Auto Differential[[id number]]        Abnormal            Final result                 Please view results  for these tests on the individual orders.       EKG:   ECG 12 Lead Other; weakness   Final Result   HEART RATE=85  bpm   RR Xnqxayik=401  ms   DC Djxmqoxu=506  ms   P Horizontal Axis=-1  deg   P Front Axis=11  deg   QRSD Interval=86  ms   QT Cpqsvzgb=665  ms   EYjA=632  ms   QRS Axis=-10  deg   T Wave Axis=50  deg   - BORDERLINE ECG -   Sinus rhythm   Probable  left atrial enlargement   No change from previous tracing   Electronically Signed By: Darryl ClarkeDANII) ([facility]) 2024-08-25 22:00:27   Date and Time of Study:2024-08-25 20:59:28          Meds given in ED:   Medications   sodium chloride 0.9 % flush 10 mL (has no administration in time range)   sodium chloride 0.9 % bolus 1,000 mL (1,000 mL Intravenous New Bag 8/25/24 2045)   iopamidol (ISOVUE-370) 76 % injection 100 mL (95 mL Intravenous Given by Other 8/25/24 2036)   pantoprazole (PROTONIX) injection 80 mg (80 mg Intravenous Given 8/25/24 2107)       Imaging results:  CT Abdomen Pelvis With Contrast    Result Date: 8/25/2024  COMBINED IMPRESSION:  1.  No definite acute PE, as above. 2.  Mild pericolonic fat stranding with liquefied stool within portions of the colon, concerning for a nonspecific colitis in the appropriate clinical setting. Additionally, the distal sigmoid colon and rectum are moderately distended with air, inspissated stool, and liquefied stool, which suggests some degree of distal constipation. 3.  Tiny hiatal hernia.    This report was finalized on 8/25/2024 9:18 PM by Dr. Alexia Watson M.D on Workstation: [id number]      CT Angiogram Chest    Result Date: 8/25/2024  COMBINED IMPRESSION:  1.  No definite acute PE, as above. 2.  Mild pericolonic fat stranding with liquefied stool within portions of the colon, concerning for a nonspecific colitis in the appropriate clinical setting. Additionally, the distal sigmoid colon and rectum are moderately distended with air, inspissated stool, and liquefied stool, which suggests some degree of  distal constipation. 3.  Tiny hiatal hernia.    This report was finalized on 8/25/2024 9:18 PM by Dr. Alexia Watson M.D on Workstation: [id number]      CT Head Without Contrast    Result Date: 8/25/2024  1.  No acute intracranial hemorrhage. Mild small vessel ischemic disease. If there is clinical concern for acute infarction, this would be better assessed with MRI. 2.  No acute cervical spine fracture.  This report was finalized on 8/25/2024 9:08 PM by Dr. Alexia Watson M.D on Workstation: [id number]      CT Cervical Spine Without Contrast    Result Date: 8/25/2024  1.  No acute intracranial hemorrhage. Mild small vessel ischemic disease. If there is clinical concern for acute infarction, this would be better assessed with MRI. 2.  No acute cervical spine fracture.  This report was finalized on 8/25/2024 9:08 PM by Dr. Alexia Watson M.D on Workstation: [id number]       Ambulatory status:   - bedrest    Social issues:   Social History     Socioeconomic History    Marital status:    Tobacco Use    Smoking status: Never     Passive exposure: Never    Smokeless tobacco: Never   Vaping Use    Vaping status: Never Used   Substance and Sexual Activity    Alcohol use: Not Currently    Drug use: Never    Sexual activity: Not Currently     Partners: Male       Peripheral Neurovascular  Peripheral Neurovascular (Adult)  Peripheral Neurovascular WDL: WDL    Neuro Cognitive  Neuro Cognitive (Adult)  Cognitive/Neuro/Behavioral WDL: .WDL except, level of consciousness  Level of Consciousness: Responds to Voice    Learning       Respiratory  Respiratory WDL  Respiratory WDL: WDL  Breath Sounds  Breath Sounds: All Fields  All Lung Fields Breath Sounds: clear    Abdominal Pain       Pain Assessments  Pain (Adult)  (0-10) Pain Rating: Rest: 5    NIH Stroke Scale       Arnie Yin RN  08/25/24 23:03 EDT      Electronically signed by Arnie Yin RN at 08/25/24 2305       Arnie Yin RN at 08/25/24 2037           Pt's PIV extravasated after contrast infusion. Pt is CT at this time-initial contrast test-had normal flow per CT tech. PIV removed, Warm compress applied. Kimberly TEIXEIRA notified at this time.     Electronically signed by Arnie Yin RN at 08/26/24 0013       Arnie Yin RN at 08/25/24 2022          Pt ambulated to bathroom with steady ambulation, daughter at her side. After bowel movement, daughter called for assistance-pt on toilet, clammy, diaphoretic and only verbalizing mumbles at this time. Pt placed in wheelchair and taken back to room. MD Gates at the bedside at this time.     Electronically signed by Arnie Yin RN at 08/26/24 0016       Rosa Amaro RN at 08/25/24 1929          Pt to ED via EMS with c/o abd pain x1day. Surgery 2 days ago, polyps removed from vaginal area.     Electronically signed by Rosa Amaro RN at 08/25/24 1930

## 2024-08-26 NOTE — H&P
Patient Name:  Jodi Maier  YOB: 1955  MRN:  [id number]  Admit Date:  8/25/2024  Patient Care Team:  Provider, Madai Known as PCP - General      Subjective   History Present Illness     Chief Complaint   Patient presents with    Abdominal Pain    Back Pain       Ms. Karo Maier is a 69 y.o. non-smoker with a history of hypertension, asthma, and GERD that presents to [facility] complaining of abdominal pain, nausea, and vomiting. She reports she had a D&C 3 days ago for an endometrial mass. She reports some worsening abdominal pain that is described as intermittent, moderate, and cramping in nature.  She states she initially had some constipation but now she is having some diarrhea and her stools are dark in color. She also reports stool incontinence twice today.  She states she has been taking Ibuprofen every 7 hours since her D&C for pain. She reports  nausea and one episode of non-bloody emesis yesterday. She also reports some vaginal spotting but denies a large amount of vaginal blood.  She denies chest pain, shortness of breath, fever, and chills. She reports dizziness that has been ongoing for months but has worsened in the past few days. Per the ED provider, she had a near syncopal episode on the toilet in the ED. Work up revealed creatinine 1.32 and WBC 14.77. A CT of the head showed mild small vessel disease but was negative for an acute intracranial process. A CT angiogram of the chest was negative for an acute pulmonary embolism. A CT of the abdomen/pelvis showed mild pericolonic fat stranding with liquefied stool within portions of the colon, concerning for nonspecific colitis. Additionally, the distal sigmoid colon and rectum are moderately distended with air, inspissated stool, and liquefied stool, which suggests some degree of distal constipation. She has been admitted for further evaluation.        History of Present Illness  Review of Systems    Constitutional:  Positive for fatigue. Negative for chills and fever.   HENT:  Negative for congestion and sore throat.    Eyes:  Negative for photophobia and visual disturbance.   Respiratory:  Negative for cough, shortness of breath and wheezing.    Cardiovascular:  Negative for chest pain, palpitations and leg swelling.   Gastrointestinal:  Positive for abdominal pain, constipation, diarrhea, nausea and vomiting.   Genitourinary:  Negative for difficulty urinating, dysuria, flank pain, frequency, hematuria, pelvic pain and urgency.   Musculoskeletal:  Positive for myalgias. Negative for arthralgias.   Skin:  Negative for color change and wound.   Neurological:  Positive for dizziness and light-headedness. Negative for seizures, facial asymmetry, speech difficulty, weakness, numbness and headaches.        Personal History     Past Medical History:   Diagnosis Date    Acid reflux     Asthma     Hypertension     Pelvic pain     PMB (postmenopausal bleeding)      Past Surgical History:   Procedure Laterality Date    D & C HYSTEROSCOPY N/A 8/22/2024    Procedure: DILATATION AND CURETTAGE HYSTEROSCOPY WITH MYOSURE;  Surgeon: Shamar Reyes MD;  Location: [facility];  Service: Obstetrics/Gynecology;  Laterality: N/A;    NO PAST SURGERIES       Family History   Problem Relation Age of Onset    Hypertension Mother     Hypertension Father     Breast cancer Neg Hx     Ovarian cancer Neg Hx     Uterine cancer Neg Hx     Colon cancer Neg Hx     Malig Hyperthermia Neg Hx      Social History     Tobacco Use    Smoking status: Never     Passive exposure: Never    Smokeless tobacco: Never   Vaping Use    Vaping status: Never Used   Substance Use Topics    Alcohol use: Not Currently    Drug use: Never     Medications Prior to Admission   Medication Sig Dispense Refill Last Dose    albuterol sulfate  (90 Base) MCG/ACT inhaler Inhale 2 puffs Every 4 (Four) Hours As Needed for Shortness of Air.   Past Month     cetirizine (zyrTEC) 10 MG tablet Take 1 tablet by mouth every night at bedtime.   8/25/2024 at 1100    enalapril (VASOTEC) 20 MG tablet Take 0.5 tablets by mouth Daily.   8/25/2024 at 1100    Fluticasone-Salmeterol (ADVAIR/WIXELA) 250-50 MCG/ACT DISKUS Inhale 1 puff Daily.   8/25/2024 at 1000    HYDROcodone-acetaminophen (Norco) 5-325 MG per tablet Take 1 tablet by mouth Every 6 (Six) Hours As Needed for Severe Pain. 5 tablet 0 Past Week    MAGNESIUM PO Take 1 tablet by mouth Daily.   Past Week    amLODIPine (NORVASC) 10 MG tablet Take 0.5 tablets by mouth Every Night.   8/24/2024 at 2200    hydroCHLOROthiazide 25 MG tablet Take 1 tablet by mouth Daily.   8/24/2024 at 1100    ibuprofen (ADVIL,MOTRIN) 600 MG tablet Take 1 tablet by mouth Every 6 (Six) Hours As Needed for Moderate Pain. 30 tablet 1     montelukast (SINGULAIR) 10 MG tablet Take 1 tablet by mouth Every Night.   8/24/2024 at 2200    UNKNOWN TO PATIENT Inhale 1 puff Daily. INSTRUCTED TO BRING DAY OF SURGERY        Allergies:    Allergies   Allergen Reactions    Penicillins Rash       Objective    Objective     Vital Signs  Temp:  [98 °F (36.7 °C)-98.3 °F (36.8 °C)] 98 °F (36.7 °C)  Heart Rate:  [70-88] 86  Resp:  [16-18] 18  BP: ()/(55-84) 125/73  SpO2:  [94 %-99 %] 99 %  on   ;   Device (Oxygen Therapy): room air  Body mass index is 33.57 kg/m².    Physical Exam  Vitals and nursing note reviewed.   Constitutional:       Appearance: She is ill-appearing.   HENT:      Head: Normocephalic and atraumatic.      Nose: Nose normal.      Mouth/Throat:      Mouth: Mucous membranes are moist.      Pharynx: Oropharynx is clear.   Eyes:      Extraocular Movements: Extraocular movements intact.      Conjunctiva/sclera: Conjunctivae normal.   Cardiovascular:      Rate and Rhythm: Normal rate and regular rhythm.      Pulses: Normal pulses.      Heart sounds: Normal heart sounds.   Pulmonary:      Effort: Pulmonary effort is normal.      Breath sounds: Normal  breath sounds.   Abdominal:      General: Bowel sounds are normal. There is no distension.      Palpations: Abdomen is soft. There is no mass.      Tenderness: There is abdominal tenderness. There is no guarding or rebound.      Hernia: No hernia is present.   Musculoskeletal:         General: No swelling. Normal range of motion.      Cervical back: Normal range of motion and neck supple.   Skin:     General: Skin is warm and dry.   Neurological:      General: No focal deficit present.      Mental Status: She is alert and oriented to person, place, and time.   Psychiatric:         Mood and Affect: Mood normal.         Behavior: Behavior normal.         Results Review:  I reviewed the patient's new clinical results.  I reviewed the patient's new imaging results and agree with the interpretation.  I reviewed the patient's other test results and agree with the interpretation  I personally viewed and interpreted the patient's EKG/Telemetry data  Discussed with ED provider.    Lab Results (last 24 hours)       Procedure Component Value Units Date/Time    CBC & Differential [[id number]]  (Abnormal) Collected: 08/25/24 2021    Specimen: Blood Updated: 08/25/24 2045    Narrative:      The following orders were created for panel order CBC & Differential.  Procedure                               Abnormality         Status                     ---------                               -----------         ------                     CBC Auto Differential[[id number]]        Abnormal            Final result                 Please view results for these tests on the individual orders.    Comprehensive Metabolic Panel [[id number]]  (Abnormal) Collected: 08/25/24 2021    Specimen: Blood Updated: 08/25/24 2108     Glucose 119 mg/dL      BUN 19 mg/dL      Creatinine 1.32 mg/dL      Sodium 138 mmol/L      Potassium 3.5 mmol/L      Chloride 103 mmol/L      CO2 22.3 mmol/L      Calcium 9.4 mg/dL      Total Protein 5.7 g/dL      Albumin 3.5 g/dL       ALT (SGPT) 14 U/L      AST (SGOT) 17 U/L      Alkaline Phosphatase 72 U/L      Total Bilirubin 0.2 mg/dL      Globulin 2.2 gm/dL      A/G Ratio 1.6 g/dL      BUN/Creatinine Ratio 14.4     Anion Gap 12.7 mmol/L      eGFR 44.1 mL/min/1.73     Narrative:      GFR Normal >60  Chronic Kidney Disease <60  Kidney Failure <15      CBC Auto Differential [[id number]]  (Abnormal) Collected: 08/25/24 2021    Specimen: Blood Updated: 08/25/24 2045     WBC 14.77 10*3/mm3      RBC 4.08 10*6/mm3      Hemoglobin 11.9 g/dL      Hematocrit 35.7 %      MCV 87.5 fL      MCH 29.2 pg      MCHC 33.3 g/dL      RDW 12.4 %      RDW-SD 39.8 fl      MPV 9.7 fL      Platelets 213 10*3/mm3      Neutrophil % 61.5 %      Lymphocyte % 31.6 %      Monocyte % 5.6 %      Eosinophil % 0.5 %      Basophil % 0.4 %      Immature Grans % 0.4 %      Neutrophils, Absolute 9.08 10*3/mm3      Lymphocytes, Absolute 4.66 10*3/mm3      Monocytes, Absolute 0.83 10*3/mm3      Eosinophils, Absolute 0.08 10*3/mm3      Basophils, Absolute 0.06 10*3/mm3      Immature Grans, Absolute 0.06 10*3/mm3      nRBC 0.0 /100 WBC     Lipase [[id number]]  (Abnormal) Collected: 08/25/24 2021    Specimen: Blood Updated: 08/25/24 2106     Lipase 103 U/L     Single High Sensitivity Troponin T [[id number]]  (Normal) Collected: 08/25/24 2021    Specimen: Blood Updated: 08/25/24 2106     HS Troponin T 11 ng/L     Narrative:      High Sensitive Troponin T Reference Range:  <14.0 ng/L- Negative Female for AMI  <22.0 ng/L- Negative Male for AMI  >=14 - Abnormal Female indicating possible myocardial injury.  >=22 - Abnormal Male indicating possible myocardial injury.   Clinicians would have to utilize clinical acumen, EKG, Troponin, and serial changes to determine if it is an Acute Myocardial Infarction or myocardial injury due to an underlying chronic condition.         Urinalysis With Microscopic If Indicated (No Culture) - Urine, Clean Catch [[id number]]  (Abnormal) Collected: 08/25/24  2056    Specimen: Urine, Clean Catch Updated: 08/25/24 2138     Color, UA Yellow     Appearance, UA Clear     pH, UA 7.0     Specific Gravity, UA 1.014     Glucose, UA Negative     Ketones, UA Negative     Bilirubin, UA Negative     Blood, UA Moderate (2+)     Protein, UA Trace     Leuk Esterase, UA Trace     Nitrite, UA Negative     Urobilinogen, UA 1.0 E.U./dL    Urinalysis, Microscopic Only - Urine, Clean Catch [[id number]]  (Abnormal) Collected: 08/25/24 2056    Specimen: Urine, Clean Catch Updated: 08/25/24 2138     RBC, UA 21-50 /HPF      WBC, UA 3-5 /HPF      Bacteria, UA None Seen /HPF      Squamous Epithelial Cells, UA 3-6 /HPF      Hyaline Casts, UA 21-30 /LPF      Methodology Automated Microscopy    Gastrointestinal Panel, PCR - Stool, Per Rectum [[id number]] Collected: 08/26/24 0231    Specimen: Stool from Per Rectum Updated: 08/26/24 0239    Clostridioides difficile Toxin - Stool, Per Rectum [[id number]] Collected: 08/26/24 0231    Specimen: Stool from Per Rectum Updated: 08/26/24 0239    Narrative:      The following orders were created for panel order Clostridioides difficile Toxin - Stool, Per Rectum.  Procedure                               Abnormality         Status                     ---------                               -----------         ------                     Clostridioides difficile...[[id number]]                      In process                   Please view results for these tests on the individual orders.    Clostridioides difficile Toxin, PCR - Stool, Per Rectum [[id number]] Collected: 08/26/24 0231    Specimen: Stool from Per Rectum Updated: 08/26/24 0239            Imaging Results (Last 24 Hours)       Procedure Component Value Units Date/Time    CT Abdomen Pelvis With Contrast [[id number]] Collected: 08/25/24 2108     Updated: 08/25/24 2122    Narrative:      CT ANGIOGRAM CHEST-, CT ABDOMEN PELVIS W CONTRAST-     HISTORY: Acute nausea, lightheadedness, generalized fatigue.  Vague  abdominal pain. Recent D&C 3 days ago for endometrial mass. Some  vaginal spotting.     TECHNIQUE: Contrast-enhanced CT of the chest was performed using a PE  protocol. CT of the abdomen and pelvis was performed following  administration of intravenous contrast. Reformatted, 3D, and MIP images  were reviewed. Radiation dose reduction techniques were utilized,  including automated exposure control and exposure modulation based on  body size.     COMPARISON: Pelvic ultrasound 8/16/2024     FINDINGS CHEST:       Evaluation of the pulmonary artery vasculature is somewhat limited due  to streak artifact from the patient's arms, which are located at her  side. There is also poor opacification of distal branches. When  accounting for the limitations, no definite acute pulmonary embolism is  identified to the proximal segmental pulmonary artery level.  No pathologically enlarged thoracic lymph nodes are identified.  Heart size is normal. There is no pericardial effusion. Great vessels  are normal in caliber.The pleural spaces are clear.  There is degenerative disc disease.  The trachea is clear. There is no focal consolidation.     FINDINGS ABDOMEN/PELVIS:     The liver is normal in size. No suspicious focal intrahepatic lesion is  identified. The gallbladder is present. The spleen is normal in size.  The pancreas is within normal limits. The adrenal glands are within  normal limits. The kidneys are within normal limits.  No pathologically enlarged abdominal or pelvic lymph nodes are  identified. There is moderate to advanced calcific atherosclerosis.  There is a tiny hiatal hernia. There is no small bowel obstruction. The  appendix is visualized. There is colonic diverticulosis. There is mild  pericolonic fat stranding, along with liquefied stool within portions of  the colon. The distal sigmoid colon and rectum are moderately distended  with air, inspissated stool, and liquefied stool. The bladder is  poorly  distended and not well assessed. The uterus is present, poorly assessed  on CT.  There is degenerative disc disease.          Impression:      COMBINED IMPRESSION:     1.  No definite acute PE, as above.  2.  Mild pericolonic fat stranding with liquefied stool within portions  of the colon, concerning for a nonspecific colitis in the appropriate  clinical setting. Additionally, the distal sigmoid colon and rectum are  moderately distended with air, inspissated stool, and liquefied stool,  which suggests some degree of distal constipation.  3.  Tiny hiatal hernia.           This report was finalized on 8/25/2024 9:18 PM by Dr. Alexia Watson M.D  on Workstation: [id number]       CT Angiogram Chest [[id number]] Collected: 08/25/24 2108     Updated: 08/25/24 2122    Narrative:      CT ANGIOGRAM CHEST-, CT ABDOMEN PELVIS W CONTRAST-     HISTORY: Acute nausea, lightheadedness, generalized fatigue. Vague  abdominal pain. Recent D&C 3 days ago for endometrial mass. Some  vaginal spotting.     TECHNIQUE: Contrast-enhanced CT of the chest was performed using a PE  protocol. CT of the abdomen and pelvis was performed following  administration of intravenous contrast. Reformatted, 3D, and MIP images  were reviewed. Radiation dose reduction techniques were utilized,  including automated exposure control and exposure modulation based on  body size.     COMPARISON: Pelvic ultrasound 8/16/2024     FINDINGS CHEST:       Evaluation of the pulmonary artery vasculature is somewhat limited due  to streak artifact from the patient's arms, which are located at her  side. There is also poor opacification of distal branches. When  accounting for the limitations, no definite acute pulmonary embolism is  identified to the proximal segmental pulmonary artery level.  No pathologically enlarged thoracic lymph nodes are identified.  Heart size is normal. There is no pericardial effusion. Great vessels  are normal in caliber.The pleural  spaces are clear.  There is degenerative disc disease.  The trachea is clear. There is no focal consolidation.     FINDINGS ABDOMEN/PELVIS:     The liver is normal in size. No suspicious focal intrahepatic lesion is  identified. The gallbladder is present. The spleen is normal in size.  The pancreas is within normal limits. The adrenal glands are within  normal limits. The kidneys are within normal limits.  No pathologically enlarged abdominal or pelvic lymph nodes are  identified. There is moderate to advanced calcific atherosclerosis.  There is a tiny hiatal hernia. There is no small bowel obstruction. The  appendix is visualized. There is colonic diverticulosis. There is mild  pericolonic fat stranding, along with liquefied stool within portions of  the colon. The distal sigmoid colon and rectum are moderately distended  with air, inspissated stool, and liquefied stool. The bladder is poorly  distended and not well assessed. The uterus is present, poorly assessed  on CT.  There is degenerative disc disease.          Impression:      COMBINED IMPRESSION:     1.  No definite acute PE, as above.  2.  Mild pericolonic fat stranding with liquefied stool within portions  of the colon, concerning for a nonspecific colitis in the appropriate  clinical setting. Additionally, the distal sigmoid colon and rectum are  moderately distended with air, inspissated stool, and liquefied stool,  which suggests some degree of distal constipation.  3.  Tiny hiatal hernia.           This report was finalized on 8/25/2024 9:18 PM by Dr. Alexia Watson M.D  on Workstation: [id number]       CT Head Without Contrast [[id number]] Collected: 08/25/24 2100     Updated: 08/25/24 2111    Narrative:      EXAM:  CT HEAD WO CONTRAST-, CT CERVICAL SPINE WO CONTRAST-     HISTORY: Fall, acute nausea with lightheadedness. Fatigue.     COMPARISON: None.     TECHNIQUE: Noncontrast images of the brain and cervical spine were  obtained. Reformatted  images were reviewed. Radiation dose reduction  techniques were utilized, including automated exposure control and  exposure modulation based on body size.     FINDINGS:       BRAIN:     The ventricles and sulci are within normal limits for patient age.  No  acute intracranial hemorrhage or pathologic extra-axial collection is  identified.  There is no midline shift or mass effect.  The basal  cisterns are patent. There is mild mineralization of bilateral basal  ganglia, likely age-related. There is mild small vessel ischemic  disease. The grey-white matter differentiation is maintained. There is  calcific intracranial atherosclerosis.     The calvarium is intact.  The mastoid air cells and visualized portions  of the paranasal sinuses are clear.     CERVICAL SPINE:     There is slight straightening of the normal cervical lordosis. There is  trace anterolisthesis of C7 on T1. Vertebral body heights are  maintained. There is mild disc height at multiple levels. There are  small degenerative endplate osteophytes at a few levels, along with  multilevel uncovertebral hypertrophy, endplate sclerosis, Schmorl's node  formation, and left greater than right facet osteoarthropathy. There is  mild spinal canal stenosis at C3-4 and C4-5. Neural foraminal stenosis  is most pronounced on the left at C3-4, where it is moderate.  No acute cervical spine fracture identified.          Impression:      1.  No acute intracranial hemorrhage. Mild small vessel ischemic  disease. If there is clinical concern for acute infarction, this would  be better assessed with MRI.  2.  No acute cervical spine fracture.     This report was finalized on 8/25/2024 9:08 PM by Dr. Alexia Watson M.D  on Workstation: [id number]       CT Cervical Spine Without Contrast [[id number]] Collected: 08/25/24 2100     Updated: 08/25/24 2111    Narrative:      EXAM:  CT HEAD WO CONTRAST-, CT CERVICAL SPINE WO CONTRAST-     HISTORY: Fall, acute nausea with  lightheadedness. Fatigue.     COMPARISON: None.     TECHNIQUE: Noncontrast images of the brain and cervical spine were  obtained. Reformatted images were reviewed. Radiation dose reduction  techniques were utilized, including automated exposure control and  exposure modulation based on body size.     FINDINGS:       BRAIN:     The ventricles and sulci are within normal limits for patient age.  No  acute intracranial hemorrhage or pathologic extra-axial collection is  identified.  There is no midline shift or mass effect.  The basal  cisterns are patent. There is mild mineralization of bilateral basal  ganglia, likely age-related. There is mild small vessel ischemic  disease. The grey-white matter differentiation is maintained. There is  calcific intracranial atherosclerosis.     The calvarium is intact.  The mastoid air cells and visualized portions  of the paranasal sinuses are clear.     CERVICAL SPINE:     There is slight straightening of the normal cervical lordosis. There is  trace anterolisthesis of C7 on T1. Vertebral body heights are  maintained. There is mild disc height at multiple levels. There are  small degenerative endplate osteophytes at a few levels, along with  multilevel uncovertebral hypertrophy, endplate sclerosis, Schmorl's node  formation, and left greater than right facet osteoarthropathy. There is  mild spinal canal stenosis at C3-4 and C4-5. Neural foraminal stenosis  is most pronounced on the left at C3-4, where it is moderate.  No acute cervical spine fracture identified.          Impression:      1.  No acute intracranial hemorrhage. Mild small vessel ischemic  disease. If there is clinical concern for acute infarction, this would  be better assessed with MRI.  2.  No acute cervical spine fracture.     This report was finalized on 8/25/2024 9:08 PM by Dr. Alexia Watson M.D  on Workstation: [id number]                   ECG 12 Lead Other; weakness   Final Result   HEART RATE=85  bpm   RR  Bguqsjmu=329  ms   PA Yyvwaqlg=419  ms   P Horizontal Axis=-1  deg   P Front Axis=11  deg   QRSD Interval=86  ms   QT Xhocflhd=097  ms   VRxE=274  ms   QRS Axis=-10  deg   T Wave Axis=50  deg   - BORDERLINE ECG -   Sinus rhythm   Probable  left atrial enlargement   No change from previous tracing   Electronically Signed By: Darryl ClarkeDANII) ([facility]) 2024-08-25 22:00:27   Date and Time of Study:2024-08-25 20:59:28           Assessment/Plan     Active Hospital Problems    Diagnosis  POA    **Colitis [K52.9]  Unknown    Dizziness [R42]  Unknown    Asthma [J45.909]  Unknown    Hypertension [I10]  Unknown    Abdominal pain [R10.9]  Unknown       Colitis  -Admit to telemetry for monitoring  -GI consult  -Clear liquid diet  -Her hgb is currently stable. Monitor H&H q 6 H and transfuse for hgb less than 7  -PRN analgesia and antiemetics  -IV Protonix q 12 H  -She has some leukocytosis. C diff stool and GI PCR are pending, will await results before starting antibiotics  -CT A/P showed constipation. Will start a daily bowel regimen  -Her creatinine is up a bit. IVF overnight, repeat labs in AM    Dizziness  Near Syncope  -Near syncope was most likely a vasovagal reaction  -CT head was negative  -Check orthostatics  -Check echo    Asthma  -Her respiratory status is stable on room air  -Continue home inhalers    Hypertension  -Blood pressures have been stable. Continue amlodipine  -Hold HCTZ and enalapril for now secondary to elevated creatinine  -Monitor    -I discussed the patients findings and my recommendations with patient and family.    VTE Prophylaxis - SCDs.  Code Status - Full code.       CARMENZA Leo  [facility]  08/26/24  03:07 EDT

## 2024-08-26 NOTE — ED NOTES
Nursing report ED to floor  Jodi Maier  69 y.o.  female    HPI :  HPI (Adult)  Stated Reason for Visit: abd pain with EMS, back pain now at triage. reent poly removals from vagina area.  History Obtained From: EMS    Chief Complaint  Chief Complaint   Patient presents with    Abdominal Pain    Back Pain       Admitting doctor:   Ian Farrell MD    Admitting diagnosis:   The primary encounter diagnosis was Gastrointestinal hemorrhage, unspecified gastrointestinal hemorrhage type. A diagnosis of Vasovagal near syncope was also pertinent to this visit.    Code status:   Current Code Status       Date Active Code Status Order ID Comments User Context       Not on file            Allergies:   Penicillins    Isolation:   No active isolations    Intake and Output  No intake or output data in the 24 hours ending 08/25/24 2303    Weight:       08/25/24  1931   Weight: 49.9 kg (110 lb)       Most recent vitals:   Vitals:    08/25/24 2131 08/25/24 2154 08/25/24 2201 08/25/24 2231   BP: 102/55  106/56 97/58   Pulse: 78 88 79 74   Resp:       Temp:       TempSrc:       SpO2: 97% 99% 98% 98%   Weight:       Height:           Active LDAs/IV Access:   Lines, Drains & Airways       Active LDAs       Name Placement date Placement time Site Days    Peripheral IV 08/25/24 1941 Anterior;Left Hand 08/25/24 1941  Hand  less than 1    Peripheral IV 08/25/24 2026 Anterior;Right;Upper Arm 08/25/24 2026  Arm  less than 1                    Labs (abnormal labs have a star):   Labs Reviewed   COMPREHENSIVE METABOLIC PANEL - Abnormal; Notable for the following components:       Result Value    Glucose 119 (*)     Creatinine 1.32 (*)     Total Protein 5.7 (*)     eGFR 44.1 (*)     All other components within normal limits    Narrative:     GFR Normal >60  Chronic Kidney Disease <60  Kidney Failure <15     URINALYSIS W/ MICROSCOPIC IF INDICATED (NO CULTURE) - Abnormal; Notable for the following components:    Blood, UA Moderate  (2+) (*)     Protein, UA Trace (*)     Leuk Esterase, UA Trace (*)     All other components within normal limits   CBC WITH AUTO DIFFERENTIAL - Abnormal; Notable for the following components:    WBC 14.77 (*)     Hemoglobin 11.9 (*)     Neutrophils, Absolute 9.08 (*)     Lymphocytes, Absolute 4.66 (*)     Immature Grans, Absolute 0.06 (*)     All other components within normal limits   LIPASE - Abnormal; Notable for the following components:    Lipase 103 (*)     All other components within normal limits   URINALYSIS, MICROSCOPIC ONLY - Abnormal; Notable for the following components:    RBC, UA 21-50 (*)     WBC, UA 3-5 (*)     Squamous Epithelial Cells, UA 3-6 (*)     All other components within normal limits   SINGLE HS TROPONIN T - Normal    Narrative:     High Sensitive Troponin T Reference Range:  <14.0 ng/L- Negative Female for AMI  <22.0 ng/L- Negative Male for AMI  >=14 - Abnormal Female indicating possible myocardial injury.  >=22 - Abnormal Male indicating possible myocardial injury.   Clinicians would have to utilize clinical acumen, EKG, Troponin, and serial changes to determine if it is an Acute Myocardial Infarction or myocardial injury due to an underlying chronic condition.        GASTROINTESTINAL PANEL, PCR (PREFERRED) DOES NOT INCLUDE CDIFF   CLOSTRIDIOIDES DIFFICILE TOXIN    Narrative:     The following orders were created for panel order Clostridioides difficile Toxin - Stool, Per Rectum.  Procedure                               Abnormality         Status                     ---------                               -----------         ------                     Clostridioides difficile...[[id number]]                                                   Please view results for these tests on the individual orders.   CLOSTRIDIOIDES DIFFICILE TOXIN, PCR   TYPE AND SCREEN   CBC AND DIFFERENTIAL    Narrative:     The following orders were created for panel order CBC & Differential.  Procedure                                Abnormality         Status                     ---------                               -----------         ------                     CBC Auto Differential[[id number]]        Abnormal            Final result                 Please view results for these tests on the individual orders.       EKG:   ECG 12 Lead Other; weakness   Final Result   HEART RATE=85  bpm   RR Kngtclxn=562  ms   ND Pspkpglz=734  ms   P Horizontal Axis=-1  deg   P Front Axis=11  deg   QRSD Interval=86  ms   QT Mbjryttc=837  ms   OGpN=650  ms   QRS Axis=-10  deg   T Wave Axis=50  deg   - BORDERLINE ECG -   Sinus rhythm   Probable  left atrial enlargement   No change from previous tracing   Electronically Signed By: Darryl ClarkeDANII) ([facility]) 2024-08-25 22:00:27   Date and Time of Study:2024-08-25 20:59:28          Meds given in ED:   Medications   sodium chloride 0.9 % flush 10 mL (has no administration in time range)   sodium chloride 0.9 % bolus 1,000 mL (1,000 mL Intravenous New Bag 8/25/24 2045)   iopamidol (ISOVUE-370) 76 % injection 100 mL (95 mL Intravenous Given by Other 8/25/24 2036)   pantoprazole (PROTONIX) injection 80 mg (80 mg Intravenous Given 8/25/24 2107)       Imaging results:  CT Abdomen Pelvis With Contrast    Result Date: 8/25/2024  COMBINED IMPRESSION:  1.  No definite acute PE, as above. 2.  Mild pericolonic fat stranding with liquefied stool within portions of the colon, concerning for a nonspecific colitis in the appropriate clinical setting. Additionally, the distal sigmoid colon and rectum are moderately distended with air, inspissated stool, and liquefied stool, which suggests some degree of distal constipation. 3.  Tiny hiatal hernia.    This report was finalized on 8/25/2024 9:18 PM by Dr. Alexia Watson M.D on Workstation: [id number]      CT Angiogram Chest    Result Date: 8/25/2024  COMBINED IMPRESSION:  1.  No definite acute PE, as above. 2.  Mild pericolonic fat stranding with liquefied stool  within portions of the colon, concerning for a nonspecific colitis in the appropriate clinical setting. Additionally, the distal sigmoid colon and rectum are moderately distended with air, inspissated stool, and liquefied stool, which suggests some degree of distal constipation. 3.  Tiny hiatal hernia.    This report was finalized on 8/25/2024 9:18 PM by Dr. Alexia Watson M.D on Workstation: [id number]      CT Head Without Contrast    Result Date: 8/25/2024  1.  No acute intracranial hemorrhage. Mild small vessel ischemic disease. If there is clinical concern for acute infarction, this would be better assessed with MRI. 2.  No acute cervical spine fracture.  This report was finalized on 8/25/2024 9:08 PM by Dr. Alexia Watson M.D on Workstation: [id number]      CT Cervical Spine Without Contrast    Result Date: 8/25/2024  1.  No acute intracranial hemorrhage. Mild small vessel ischemic disease. If there is clinical concern for acute infarction, this would be better assessed with MRI. 2.  No acute cervical spine fracture.  This report was finalized on 8/25/2024 9:08 PM by Dr. Alexia Watson M.D on Workstation: [id number]       Ambulatory status:   - bedrest    Social issues:   Social History     Socioeconomic History    Marital status:    Tobacco Use    Smoking status: Never     Passive exposure: Never    Smokeless tobacco: Never   Vaping Use    Vaping status: Never Used   Substance and Sexual Activity    Alcohol use: Not Currently    Drug use: Never    Sexual activity: Not Currently     Partners: Male       Peripheral Neurovascular  Peripheral Neurovascular (Adult)  Peripheral Neurovascular WDL: WDL    Neuro Cognitive  Neuro Cognitive (Adult)  Cognitive/Neuro/Behavioral WDL: .WDL except, level of consciousness  Level of Consciousness: Responds to Voice    Learning       Respiratory  Respiratory WDL  Respiratory WDL: WDL  Breath Sounds  Breath Sounds: All Fields  All Lung Fields Breath Sounds:  clear    Abdominal Pain       Pain Assessments  Pain (Adult)  (0-10) Pain Rating: Rest: 5    NIH Stroke Scale       Arnie Yin RN  08/25/24 23:03 EDT

## 2024-08-26 NOTE — CASE MANAGEMENT/SOCIAL WORK
Discharge Planning Assessment  [facility]     Patient Name: Jodi Maier  MRN: [id number]  Today's Date: 8/26/2024    Admit Date: 8/25/2024    Plan: Home with family and HH pending acceptance   Discharge Needs Assessment       Row Name 08/26/24 1703       Living Environment    People in Home child(felix), adult;grandchild(felix)    Name(s) of People in Home Granddaughter Roselyn and dtr Kay    Current Living Arrangements home    Potentially Unsafe Housing Conditions none    Primary Care Provided by self    Family Caregiver if Needed child(felix), adult;grandchild(felix), adult    Quality of Family Relationships helpful;involved    Able to Return to Prior Arrangements yes       Resource/Environmental Concerns    Resource/Environmental Concerns none       Transition Planning    Patient/Family Anticipates Transition to home with family;home with help/services    Patient/Family Anticipated Services at Transition home health care    Transportation Anticipated family or friend will provide       Discharge Needs Assessment    Readmission Within the Last 30 Days no previous admission in last 30 days    Equipment Currently Used at Home bp cuff    Concerns to be Addressed adjustment to diagnosis/illness    Concerns Comments HH    Anticipated Changes Related to Illness none    Equipment Needed After Discharge none                   Discharge Plan       Row Name 08/26/24 1707       Plan    Plan Home with family and HH pending acceptance    Patient/Family in Agreement with Plan yes    Plan Comments Spoke with pt granddaughter Roselyn utilized  ipad at bedside, introduced self and explained CCP role, and confirmed pharmacy and face sheet information verified. Pt lives with trixie Quintana in 1 level home, with 2 MARCELA.  She is IADL's, uses no medical equipment and she has no SNF or HH history. Family would like pt to have HHPT at d/c, no preference for HH agency, referral [facility].  Pt plans home  with dtr to transport, no other anticipated needs. CCP will follow - Trixie ROWE                  Continued Care and Services - Admitted Since 8/25/2024       Home Medical Care       Service Provider Request Status Selected Services Address Phone Fax Patient Preferred    [facility] Pending - Request Sent N/A [id number] [address] [phone] --                  Expected Discharge Date and Time       Expected Discharge Date Expected Discharge Time    Aug 28, 2024            Demographic Summary       Row Name 08/26/24 1701       General Information    Admission Type observation                   Functional Status       Row Name 08/26/24 1701       Functional Status    Usual Activity Tolerance excellent    Current Activity Tolerance moderate       Assessment of Health Literacy    Health Literacy Moderate       Functional Status, IADL    Medications independent    Meal Preparation independent    Housekeeping assistive person    Laundry assistive person    Shopping assistive person       Mental Status    General Appearance WDL WDL       Mental Status Summary    Recent Changes in Mental Status/Cognitive Functioning no changes                   Psychosocial    No documentation.                  Abuse/Neglect    No documentation.                  Legal       Row Name 08/26/24 1703       Financial/Legal    Who Manages Finances if Patient Unable children                   Substance Abuse    No documentation.                  Patient Forms    No documentation.                     Trixie Miranda RN

## 2024-08-27 ENCOUNTER — ANESTHESIA EVENT (OUTPATIENT)
Dept: GASTROENTEROLOGY | Facility: HOSPITAL | Age: 69
End: 2024-08-27
Payer: COMMERCIAL

## 2024-08-27 ENCOUNTER — ANESTHESIA (OUTPATIENT)
Dept: GASTROENTEROLOGY | Facility: HOSPITAL | Age: 69
End: 2024-08-27
Payer: COMMERCIAL

## 2024-08-27 LAB
ALBUMIN SERPL-MCNC: 3.2 G/DL (ref 3.5–5.2)
ANION GAP SERPL CALCULATED.3IONS-SCNC: 6 MMOL/L (ref 5–15)
BASOPHILS # BLD AUTO: 0.04 10*3/MM3 (ref 0–0.2)
BASOPHILS NFR BLD AUTO: 0.3 % (ref 0–1.5)
BUN SERPL-MCNC: 6 MG/DL (ref 8–23)
BUN/CREAT SERPL: 8.7 (ref 7–25)
CALCIUM SPEC-SCNC: 8.4 MG/DL (ref 8.6–10.5)
CHLORIDE SERPL-SCNC: 106 MMOL/L (ref 98–107)
CO2 SERPL-SCNC: 25 MMOL/L (ref 22–29)
CREAT SERPL-MCNC: 0.69 MG/DL (ref 0.57–1)
D-LACTATE SERPL-SCNC: 0.6 MMOL/L (ref 0.5–2)
DEPRECATED RDW RBC AUTO: 39.8 FL (ref 37–54)
EGFRCR SERPLBLD CKD-EPI 2021: 94.7 ML/MIN/1.73
EOSINOPHIL # BLD AUTO: 0.03 10*3/MM3 (ref 0–0.4)
EOSINOPHIL NFR BLD AUTO: 0.2 % (ref 0.3–6.2)
ERYTHROCYTE [DISTWIDTH] IN BLOOD BY AUTOMATED COUNT: 12.7 % (ref 12.3–15.4)
GLUCOSE SERPL-MCNC: 112 MG/DL (ref 65–99)
HCT VFR BLD AUTO: 33.1 % (ref 34–46.6)
HCT VFR BLD AUTO: 35.2 % (ref 34–46.6)
HGB BLD-MCNC: 11.2 G/DL (ref 12–15.9)
HGB BLD-MCNC: 12.1 G/DL (ref 12–15.9)
IMM GRANULOCYTES # BLD AUTO: 0.07 10*3/MM3 (ref 0–0.05)
IMM GRANULOCYTES NFR BLD AUTO: 0.5 % (ref 0–0.5)
LYMPHOCYTES # BLD AUTO: 3.11 10*3/MM3 (ref 0.7–3.1)
LYMPHOCYTES NFR BLD AUTO: 20.3 % (ref 19.6–45.3)
MAGNESIUM SERPL-MCNC: 1.7 MG/DL (ref 1.6–2.4)
MCH RBC QN AUTO: 29.3 PG (ref 26.6–33)
MCHC RBC AUTO-ENTMCNC: 33.8 G/DL (ref 31.5–35.7)
MCV RBC AUTO: 86.6 FL (ref 79–97)
MONOCYTES # BLD AUTO: 1.49 10*3/MM3 (ref 0.1–0.9)
MONOCYTES NFR BLD AUTO: 9.7 % (ref 5–12)
NEUTROPHILS NFR BLD AUTO: 10.58 10*3/MM3 (ref 1.7–7)
NEUTROPHILS NFR BLD AUTO: 69 % (ref 42.7–76)
NRBC BLD AUTO-RTO: 0 /100 WBC (ref 0–0.2)
PHOSPHATE SERPL-MCNC: 2 MG/DL (ref 2.5–4.5)
PHOSPHATE SERPL-MCNC: 2 MG/DL (ref 2.5–4.5)
PLATELET # BLD AUTO: 179 10*3/MM3 (ref 140–450)
PMV BLD AUTO: 9.7 FL (ref 6–12)
POTASSIUM SERPL-SCNC: 3.7 MMOL/L (ref 3.5–5.2)
PROCALCITONIN SERPL-MCNC: 0.18 NG/ML (ref 0–0.25)
RBC # BLD AUTO: 3.82 10*6/MM3 (ref 3.77–5.28)
SODIUM SERPL-SCNC: 137 MMOL/L (ref 136–145)
WBC NRBC COR # BLD AUTO: 15.32 10*3/MM3 (ref 3.4–10.8)

## 2024-08-27 PROCEDURE — 85014 HEMATOCRIT: CPT | Performed by: INTERNAL MEDICINE

## 2024-08-27 PROCEDURE — G0378 HOSPITAL OBSERVATION PER HR: HCPCS

## 2024-08-27 PROCEDURE — 80069 RENAL FUNCTION PANEL: CPT | Performed by: INTERNAL MEDICINE

## 2024-08-27 PROCEDURE — 96366 THER/PROPH/DIAG IV INF ADDON: CPT

## 2024-08-27 PROCEDURE — 25810000003 SODIUM CHLORIDE 0.9 % SOLUTION: Performed by: NURSE PRACTITIONER

## 2024-08-27 PROCEDURE — 83735 ASSAY OF MAGNESIUM: CPT | Performed by: INTERNAL MEDICINE

## 2024-08-27 PROCEDURE — 45380 COLONOSCOPY AND BIOPSY: CPT | Performed by: INTERNAL MEDICINE

## 2024-08-27 PROCEDURE — 83605 ASSAY OF LACTIC ACID: CPT | Performed by: INTERNAL MEDICINE

## 2024-08-27 PROCEDURE — 96361 HYDRATE IV INFUSION ADD-ON: CPT

## 2024-08-27 PROCEDURE — 94799 UNLISTED PULMONARY SVC/PX: CPT

## 2024-08-27 PROCEDURE — 85018 HEMOGLOBIN: CPT | Performed by: INTERNAL MEDICINE

## 2024-08-27 PROCEDURE — 87040 BLOOD CULTURE FOR BACTERIA: CPT | Performed by: INTERNAL MEDICINE

## 2024-08-27 PROCEDURE — 25010000002 CEFTRIAXONE PER 250 MG: Performed by: INTERNAL MEDICINE

## 2024-08-27 PROCEDURE — 94761 N-INVAS EAR/PLS OXIMETRY MLT: CPT

## 2024-08-27 PROCEDURE — 84145 PROCALCITONIN (PCT): CPT | Performed by: INTERNAL MEDICINE

## 2024-08-27 PROCEDURE — 25010000002 PROPOFOL 200 MG/20ML EMULSION: Performed by: NURSE ANESTHETIST, CERTIFIED REGISTERED

## 2024-08-27 PROCEDURE — 25010000002 ONDANSETRON PER 1 MG: Performed by: NURSE PRACTITIONER

## 2024-08-27 PROCEDURE — 25810000003 LACTATED RINGERS PER 1000 ML: Performed by: NURSE ANESTHETIST, CERTIFIED REGISTERED

## 2024-08-27 PROCEDURE — 25010000002 POTASSIUM CHLORIDE 10 MEQ/100ML SOLUTION: Performed by: INTERNAL MEDICINE

## 2024-08-27 PROCEDURE — 25010000002 METRONIDAZOLE 500 MG/100ML SOLUTION: Performed by: INTERNAL MEDICINE

## 2024-08-27 PROCEDURE — 88305 TISSUE EXAM BY PATHOLOGIST: CPT | Performed by: INTERNAL MEDICINE

## 2024-08-27 PROCEDURE — 25010000002 PHENYLEPHRINE 10 MG/ML SOLUTION: Performed by: NURSE ANESTHETIST, CERTIFIED REGISTERED

## 2024-08-27 PROCEDURE — 84100 ASSAY OF PHOSPHORUS: CPT | Performed by: INTERNAL MEDICINE

## 2024-08-27 PROCEDURE — 96367 TX/PROPH/DG ADDL SEQ IV INF: CPT

## 2024-08-27 PROCEDURE — 25810000003 SODIUM CHLORIDE 0.9 % SOLUTION: Performed by: INTERNAL MEDICINE

## 2024-08-27 PROCEDURE — 96376 TX/PRO/DX INJ SAME DRUG ADON: CPT

## 2024-08-27 PROCEDURE — 85025 COMPLETE CBC W/AUTO DIFF WBC: CPT | Performed by: INTERNAL MEDICINE

## 2024-08-27 PROCEDURE — 94664 DEMO&/EVAL PT USE INHALER: CPT

## 2024-08-27 RX ORDER — SODIUM CHLORIDE 9 MG/ML
30 INJECTION, SOLUTION INTRAVENOUS CONTINUOUS PRN
Status: DISCONTINUED | OUTPATIENT
Start: 2024-08-27 | End: 2024-08-27

## 2024-08-27 RX ORDER — SODIUM CHLORIDE 0.9 % (FLUSH) 0.9 %
10 SYRINGE (ML) INJECTION EVERY 12 HOURS SCHEDULED
Status: DISCONTINUED | OUTPATIENT
Start: 2024-08-27 | End: 2024-08-27

## 2024-08-27 RX ORDER — PHENYLEPHRINE HYDROCHLORIDE 10 MG/ML
INJECTION INTRAVENOUS AS NEEDED
Status: DISCONTINUED | OUTPATIENT
Start: 2024-08-27 | End: 2024-08-27 | Stop reason: SURG

## 2024-08-27 RX ORDER — PROPOFOL 10 MG/ML
INJECTION, EMULSION INTRAVENOUS CONTINUOUS PRN
Status: DISCONTINUED | OUTPATIENT
Start: 2024-08-27 | End: 2024-08-27 | Stop reason: SURG

## 2024-08-27 RX ORDER — METRONIDAZOLE 500 MG/100ML
500 INJECTION, SOLUTION INTRAVENOUS EVERY 8 HOURS
Status: DISCONTINUED | OUTPATIENT
Start: 2024-08-27 | End: 2024-08-28 | Stop reason: HOSPADM

## 2024-08-27 RX ORDER — LIDOCAINE HYDROCHLORIDE 20 MG/ML
INJECTION, SOLUTION INFILTRATION; PERINEURAL AS NEEDED
Status: DISCONTINUED | OUTPATIENT
Start: 2024-08-27 | End: 2024-08-27 | Stop reason: SURG

## 2024-08-27 RX ORDER — FENTANYL/ROPIVACAINE/NS/PF 2-625MCG/1
15 PLASTIC BAG, INJECTION (ML) EPIDURAL ONCE
Status: COMPLETED | OUTPATIENT
Start: 2024-08-27 | End: 2024-08-27

## 2024-08-27 RX ORDER — SODIUM CHLORIDE, SODIUM LACTATE, POTASSIUM CHLORIDE, CALCIUM CHLORIDE 600; 310; 30; 20 MG/100ML; MG/100ML; MG/100ML; MG/100ML
INJECTION, SOLUTION INTRAVENOUS CONTINUOUS PRN
Status: DISCONTINUED | OUTPATIENT
Start: 2024-08-27 | End: 2024-08-27 | Stop reason: SURG

## 2024-08-27 RX ADMIN — Medication 10 ML: at 20:11

## 2024-08-27 RX ADMIN — PROPOFOL 60 MG: 10 INJECTION, EMULSION INTRAVENOUS at 10:49

## 2024-08-27 RX ADMIN — PHENYLEPHRINE HYDROCHLORIDE 100 MCG: 10 INJECTION INTRAVENOUS at 10:56

## 2024-08-27 RX ADMIN — METRONIDAZOLE 500 MG: 500 INJECTION, SOLUTION INTRAVENOUS at 17:41

## 2024-08-27 RX ADMIN — PANTOPRAZOLE SODIUM 40 MG: 40 INJECTION, POWDER, FOR SOLUTION INTRAVENOUS at 08:33

## 2024-08-27 RX ADMIN — POLYETHYLENE GLYCOL 3350 0.5 BOTTLE: 17 POWDER, FOR SOLUTION ORAL at 04:59

## 2024-08-27 RX ADMIN — PANTOPRAZOLE SODIUM 40 MG: 40 INJECTION, POWDER, FOR SOLUTION INTRAVENOUS at 20:09

## 2024-08-27 RX ADMIN — CEFTRIAXONE 2000 MG: 2 INJECTION, POWDER, FOR SOLUTION INTRAMUSCULAR; INTRAVENOUS at 08:47

## 2024-08-27 RX ADMIN — METRONIDAZOLE 500 MG: 500 INJECTION, SOLUTION INTRAVENOUS at 11:59

## 2024-08-27 RX ADMIN — SODIUM CHLORIDE, POTASSIUM CHLORIDE, SODIUM LACTATE AND CALCIUM CHLORIDE: 600; 310; 30; 20 INJECTION, SOLUTION INTRAVENOUS at 10:45

## 2024-08-27 RX ADMIN — POTASSIUM CHLORIDE 10 MEQ: 7.46 INJECTION, SOLUTION INTRAVENOUS at 00:02

## 2024-08-27 RX ADMIN — LIDOCAINE HYDROCHLORIDE 60 MG: 20 INJECTION, SOLUTION INFILTRATION; PERINEURAL at 10:49

## 2024-08-27 RX ADMIN — BUDESONIDE AND FORMOTEROL FUMARATE DIHYDRATE 2 PUFF: 160; 4.5 AEROSOL RESPIRATORY (INHALATION) at 08:58

## 2024-08-27 RX ADMIN — POTASSIUM PHOSPHATE, MONOBASIC POTASSIUM PHOSPHATE, DIBASIC INJECTION, 15 MMOL: 236; 224 SOLUTION, CONCENTRATE INTRAVENOUS at 11:51

## 2024-08-27 RX ADMIN — Medication 10 ML: at 08:34

## 2024-08-27 RX ADMIN — SODIUM CHLORIDE 30 ML/HR: 9 INJECTION, SOLUTION INTRAVENOUS at 09:48

## 2024-08-27 RX ADMIN — SODIUM CHLORIDE 75 ML/HR: 9 INJECTION, SOLUTION INTRAVENOUS at 06:11

## 2024-08-27 RX ADMIN — PROPOFOL 160 MCG/KG/MIN: 10 INJECTION, EMULSION INTRAVENOUS at 10:50

## 2024-08-27 RX ADMIN — ONDANSETRON 4 MG: 2 INJECTION INTRAMUSCULAR; INTRAVENOUS at 09:05

## 2024-08-27 RX ADMIN — CETIRIZINE HYDROCHLORIDE 10 MG: 10 TABLET ORAL at 20:09

## 2024-08-27 RX ADMIN — MONTELUKAST SODIUM 10 MG: 10 TABLET, FILM COATED ORAL at 20:09

## 2024-08-27 NOTE — SIGNIFICANT NOTE
08/27/24 1351   OTHER   Discipline occupational therapist   Rehab Time/Intention   Session Not Performed patient/family declined evaluation  (Pt lethargic, sleeping after procedure that took place this morning. Family requesting f/o tmrw.)   Recommendation   OT - Next Appointment 08/28/24

## 2024-08-27 NOTE — SIGNIFICANT NOTE
08/27/24 1440   OTHER   Discipline physical therapist   Rehab Time/Intention   Session Not Performed patient/family declined treatment  (pt groggy, sleeping after colonoscopy, PT will follow up tomorrow)   Recommendation   PT - Next Appointment 08/28/24

## 2024-08-27 NOTE — ANESTHESIA POSTPROCEDURE EVALUATION
Patient: Jodi Maier    Procedure Summary       Date: 08/27/24 Room / Location:  [facility]    Anesthesia Start: 1045 Anesthesia Stop: 1107    Procedure: COLONOSCOPY to cecum and TI with cold biopsies Diagnosis:       Generalized abdominal pain      Colitis      (Generalized abdominal pain [R10.84])      (Colitis [K52.9])    Surgeons: Eva Mosher MD Provider: Clint Slade MD    Anesthesia Type: MAC ASA Status: 3            Anesthesia Type: MAC    Vitals  Vitals Value Taken Time   BP 93/55 08/27/24 1117   Temp     Pulse 81 08/27/24 1125   Resp 18 08/27/24 1116   SpO2 98 % 08/27/24 1125   Vitals shown include unfiled device data.        Post Anesthesia Care and Evaluation    Patient location during evaluation: bedside  Patient participation: complete - patient participated  Level of consciousness: awake  Pain management: adequate    Airway patency: patent  Anesthetic complications: No anesthetic complications  PONV Status: none  Cardiovascular status: acceptable  Respiratory status: acceptable  Hydration status: acceptable  Post Neuraxial Block status: Motor and sensory function returned to baseline

## 2024-08-27 NOTE — PROGRESS NOTES
Name: Jodi Maier ADMIT: 2024   : 1955  PCP: Provider, No Known    MRN: [id number] LOS: 0 days   AGE/SEX: 69 y.o. female  ROOM: [facility]     Subjective   Subjective   Chief Complaint   Patient presents with    Abdominal Pain    Back Pain     Saw after endoscopy.  No chest pain.  No nausea vomiting or abdominal pain currently.  Family at bedside helped with translation.     Objective   Objective   Vital Signs  Temp:  [98.4 °F (36.9 °C)-98.8 °F (37.1 °C)] 98.4 °F (36.9 °C)  Heart Rate:  [] 91  Resp:  [14-19] 18  BP: ()/(54-84) 113/62  SpO2:  [92 %-98 %] 98 %  on   ;   Device (Oxygen Therapy): room air  Body mass index is 33.57 kg/m².    Physical Exam  Vitals and nursing note reviewed.   Constitutional:       General: She is not in acute distress.     Appearance: She is not diaphoretic.   Cardiovascular:      Rate and Rhythm: Normal rate and regular rhythm.      Pulses: Normal pulses.   Pulmonary:      Effort: Pulmonary effort is normal.      Breath sounds: No wheezing.   Abdominal:      Palpations: Abdomen is soft.      Tenderness: There is no abdominal tenderness.   Skin:     General: Skin is warm and dry.   Neurological:      Mental Status: She is alert. Mental status is at baseline.   Psychiatric:         Mood and Affect: Mood normal.         Behavior: Behavior normal.       Results Review  I reviewed the patient's new clinical results.    Results from last 7 days   Lab Units 24  0616 24  0009 24  16224  0524   WBC 10*3/mm3 15.32*  --  12.81* 13.84* 14.77*   HEMOGLOBIN g/dL 11.2* 12.1 12.2  12.2 12.8  12.8 11.9*   PLATELETS 10*3/mm3 179  --  202 216 213     Results from last 7 days   Lab Units 24  0616 24  1626 24  0558 24   SODIUM mmol/L 137 140 136 138   POTASSIUM mmol/L 3.7 3.2* 3.7 3.5   CHLORIDE mmol/L 106 105 102 103   CO2 mmol/L 25.0 24.9 24.2 22.3   BUN mg/dL 6* 11 16 19   CREATININE mg/dL 0.69  "0.85 0.89 1.32*   GLUCOSE mg/dL 112* 114* 133* 119*   EGFR mL/min/1.73 94.7 74.7 70.7 44.1*     Results from last 7 days   Lab Units 08/27/24  0616 08/26/24  1626 08/25/24 2021   ALBUMIN g/dL 3.2* 3.6 3.5   BILIRUBIN mg/dL  --  0.4 0.2   ALK PHOS U/L  --  69 72   AST (SGOT) U/L  --  17 17   ALT (SGPT) U/L  --  13 14     Results from last 7 days   Lab Units 08/27/24  0616 08/26/24  1626 08/26/24  0558 08/25/24 2021   CALCIUM mg/dL 8.4* 8.7 9.4 9.4   ALBUMIN g/dL 3.2* 3.6  --  3.5   MAGNESIUM mg/dL 1.7  --   --   --    PHOSPHORUS mg/dL 2.0*  --   --   --      Results from last 7 days   Lab Units 08/27/24  0833 08/27/24  0616   PROCALCITONIN ng/mL  --  0.18   LACTATE mmol/L 0.6  --      No results found for: \"HGBA1C\", \"POCGLU\"    CT Abdomen Pelvis With Contrast    Result Date: 8/25/2024  COMBINED IMPRESSION:  1.  No definite acute PE, as above. 2.  Mild pericolonic fat stranding with liquefied stool within portions of the colon, concerning for a nonspecific colitis in the appropriate clinical setting. Additionally, the distal sigmoid colon and rectum are moderately distended with air, inspissated stool, and liquefied stool, which suggests some degree of distal constipation. 3.  Tiny hiatal hernia.    This report was finalized on 8/25/2024 9:18 PM by Dr. Alexia Watson M.D on Workstation: [id number]      CT Angiogram Chest    Result Date: 8/25/2024  COMBINED IMPRESSION:  1.  No definite acute PE, as above. 2.  Mild pericolonic fat stranding with liquefied stool within portions of the colon, concerning for a nonspecific colitis in the appropriate clinical setting. Additionally, the distal sigmoid colon and rectum are moderately distended with air, inspissated stool, and liquefied stool, which suggests some degree of distal constipation. 3.  Tiny hiatal hernia.    This report was finalized on 8/25/2024 9:18 PM by Dr. Alexia Watson M.D on Workstation: [id number]      CT Head Without Contrast    Result Date: " 8/25/2024  1.  No acute intracranial hemorrhage. Mild small vessel ischemic disease. If there is clinical concern for acute infarction, this would be better assessed with MRI. 2.  No acute cervical spine fracture.  This report was finalized on 8/25/2024 9:08 PM by Dr. Alexia Watson M.D on Workstation: [id number]      CT Cervical Spine Without Contrast    Result Date: 8/25/2024  1.  No acute intracranial hemorrhage. Mild small vessel ischemic disease. If there is clinical concern for acute infarction, this would be better assessed with MRI. 2.  No acute cervical spine fracture.  This report was finalized on 8/25/2024 9:08 PM by Dr. Alexia Watson M.D on Workstation: [id number]       I have personally reviewed all medications:  Scheduled Medications  amLODIPine, 5 mg, Oral, Nightly  budesonide-formoterol, 2 puff, Inhalation, BID - RT  cefTRIAXone, 2,000 mg, Intravenous, Q24H  cetirizine, 10 mg, Oral, Nightly  metroNIDAZOLE, 500 mg, Intravenous, Q8H  montelukast, 10 mg, Oral, Nightly  pantoprazole, 40 mg, Intravenous, Q12H  potassium phosphate, 15 mmol, Intravenous, Once      Infusions  sodium chloride, 75 mL/hr, Last Rate: 75 mL/hr (08/27/24 1230)      Diet  Diet: Gastrointestinal; Fiber-Restricted; Texture: Soft to Chew (NDD 3); Soft to Chew: Whole Meat; Fluid Consistency: Thin (IDDSI 0)    I have personally reviewed:  [x]  Laboratory   [x]  Microbiology   [x]  Radiology   [x]  EKG/Telemetry  []  Cardiology/Vascular   []  Pathology    []  Records       Assessment/Plan     Active Hospital Problems    Diagnosis  POA    **Colitis [K52.9]  Unknown    Dizziness [R42]  Unknown    Asthma [J45.909]  Unknown    Hypertension [I10]  Unknown    Abdominal pain [R10.9]  Unknown      Resolved Hospital Problems   No resolved problems to display.       69 y.o. female admitted with Colitis.    Colitis: She did screen positive for sepsis today.  Procalcitonin is not elevated.  Have sent blood cultures and started Rocephin and  Flagyl.  There is an allergy to penicillin listed although it was a low/rash only.  Underwent colonoscopy showing diffuse and patchy moderate inflammation.  Biopsy pending.  GI following.  Dizziness: Vasovagal per description.  She had GI bleeding as well at the time.  Iron saturation 12%.  Can give oral iron at discharge.  Symptoms are improving.  Asthma: No acute exacerbation  Hypertension: Orthostatics were negative but she does have some borderline low blood pressure recorded.  Hold additional Norvasc.  Thiazide has been held as well.  Off of Vasotec.  Will monitor progression.  PPX: SCD  Disposition: TBD/possibly tomorrow    Expected Discharge Date: 8/28/2024; Expected Discharge Time:      Daniel Ayers MD  [facility]  08/27/24  14:32 EDT    Dictated portions of note using Dragon dictation software.  Copied text in this note has been reviewed by me and remains accurate as of 08/27/24

## 2024-08-27 NOTE — ANESTHESIA PREPROCEDURE EVALUATION
Anesthesia Evaluation     Patient summary reviewed and Nursing notes reviewed                Airway   Mallampati: II  Dental    (+) upper dentures    Pulmonary    (+) asthma,  Cardiovascular     Rhythm: regular  Rate: normal    (+) hypertension      Neuro/Psych  (+) dizziness/light headedness  GI/Hepatic/Renal/Endo    (+) obesity, GERD, GI bleeding     Musculoskeletal (-) negative ROS    Abdominal    Substance History - negative use     OB/GYN negative ob/gyn ROS         Other                      Anesthesia Plan    ASA 3     MAC     ( utilized )  intravenous induction     Anesthetic plan, risks, benefits, and alternatives have been provided, discussed and informed consent has been obtained with: patient.    CODE STATUS:    Code Status (Patient has no pulse and is not breathing): CPR (Attempt to Resuscitate)  Medical Interventions (Patient has pulse or is breathing): Full Support

## 2024-08-27 NOTE — PLAN OF CARE
Problem: Adult Inpatient Plan of Care  Goal: Absence of Hospital-Acquired Illness or Injury  Outcome: Ongoing, Progressing  Intervention: Identify and Manage Fall Risk  Recent Flowsheet Documentation  Taken 8/27/2024 0600 by Sivakumar Starr RN  Safety Promotion/Fall Prevention:   safety round/check completed   room organization consistent   nonskid shoes/slippers when out of bed   lighting adjusted   fall prevention program maintained   clutter free environment maintained   assistive device/personal items within reach  Taken 8/27/2024 0400 by Sivakumar Starr RN  Safety Promotion/Fall Prevention:   safety round/check completed   room organization consistent   nonskid shoes/slippers when out of bed   lighting adjusted   fall prevention program maintained   clutter free environment maintained   assistive device/personal items within reach  Taken 8/27/2024 0202 by Sivakumar Starr RN  Safety Promotion/Fall Prevention:   safety round/check completed   room organization consistent   nonskid shoes/slippers when out of bed   lighting adjusted   fall prevention program maintained   clutter free environment maintained   assistive device/personal items within reach  Taken 8/27/2024 0000 by Sivakumar Starr RN  Safety Promotion/Fall Prevention:   safety round/check completed   room organization consistent   nonskid shoes/slippers when out of bed   lighting adjusted   fall prevention program maintained   clutter free environment maintained   assistive device/personal items within reach  Taken 8/26/2024 2214 by Sivakumar Starr, RN  Safety Promotion/Fall Prevention:   safety round/check completed   room organization consistent   nonskid shoes/slippers when out of bed   lighting adjusted   fall prevention program maintained   clutter free environment maintained   assistive device/personal items within reach  Taken 8/26/2024 2106 by Sivakumar Starr RN  Safety Promotion/Fall  Prevention:   safety round/check completed   room organization consistent   nonskid shoes/slippers when out of bed   lighting adjusted   fall prevention program maintained   clutter free environment maintained   assistive device/personal items within reach  Intervention: Prevent Skin Injury  Recent Flowsheet Documentation  Taken 8/27/2024 0600 by Sivakumar Starr RN  Body Position: position changed independently  Taken 8/27/2024 0400 by Sivakumar Starr RN  Body Position: position changed independently  Taken 8/27/2024 0202 by Sivakumar Starr RN  Body Position: position changed independently  Taken 8/27/2024 0000 by Sivakumar Starr RN  Body Position: position changed independently  Taken 8/26/2024 2214 by Sivakumar Starr RN  Body Position: position changed independently  Taken 8/26/2024 2106 by Sivakumar Starr RN  Body Position: position changed independently  Intervention: Prevent and Manage VTE (Venous Thromboembolism) Risk  Recent Flowsheet Documentation  Taken 8/27/2024 0600 by Sivakumar Starr RN  Activity Management: activity encouraged  Taken 8/27/2024 0400 by Sivakumar Starr RN  Activity Management: activity minimized  Taken 8/27/2024 0000 by Sivakumar Starr RN  Activity Management: activity minimized  Taken 8/26/2024 2106 by Sivakumar Starr RN  Activity Management: activity encouraged  Range of Motion: active ROM (range of motion) encouraged  Intervention: Prevent Infection  Recent Flowsheet Documentation  Taken 8/27/2024 0600 by Sivakumar Starr RN  Infection Prevention:   rest/sleep promoted   personal protective equipment utilized   hand hygiene promoted   environmental surveillance performed  Taken 8/27/2024 0400 by Sivakumar Starr RN  Infection Prevention:   rest/sleep promoted   personal protective equipment utilized   hand hygiene promoted   environmental surveillance performed  Taken 8/27/2024  0202 by Sivakumar Starr, PUNEET  Infection Prevention:   rest/sleep promoted   personal protective equipment utilized   hand hygiene promoted   environmental surveillance performed  Taken 8/27/2024 0000 by Sivakumar Starr RN  Infection Prevention:   rest/sleep promoted   personal protective equipment utilized   hand hygiene promoted   environmental surveillance performed  Taken 8/26/2024 2214 by Sivakumar Starr RN  Infection Prevention:   rest/sleep promoted   personal protective equipment utilized   hand hygiene promoted   environmental surveillance performed  Taken 8/26/2024 2106 by Sivakumar Starr, PUNEET  Infection Prevention:   rest/sleep promoted   personal protective equipment utilized   hand hygiene promoted   environmental surveillance performed   Goal Outcome Evaluation:  Plan of Care Reviewed With: patient           Outcome Evaluation: AOx4, VSS, SR on telemetry, room air, c/o pain and nausea -PRN medications given, Potassium correction done, bowel prep done, for colonoscopy today consent secured, bed alarm on, call light within reach.

## 2024-08-28 ENCOUNTER — TELEPHONE (OUTPATIENT)
Dept: GASTROENTEROLOGY | Facility: CLINIC | Age: 69
End: 2024-08-28
Payer: COMMERCIAL

## 2024-08-28 ENCOUNTER — APPOINTMENT (OUTPATIENT)
Dept: CARDIOLOGY | Facility: HOSPITAL | Age: 69
End: 2024-08-28
Payer: COMMERCIAL

## 2024-08-28 VITALS
OXYGEN SATURATION: 100 % | SYSTOLIC BLOOD PRESSURE: 113 MMHG | WEIGHT: 110 LBS | DIASTOLIC BLOOD PRESSURE: 61 MMHG | RESPIRATION RATE: 18 BRPM | TEMPERATURE: 98.2 F | HEIGHT: 55 IN | HEART RATE: 80 BPM | BODY MASS INDEX: 25.46 KG/M2

## 2024-08-28 PROBLEM — K55.9 COLITIS, ISCHEMIC: Status: ACTIVE | Noted: 2024-08-26

## 2024-08-28 LAB
ALBUMIN SERPL-MCNC: 3.7 G/DL (ref 3.5–5.2)
ANION GAP SERPL CALCULATED.3IONS-SCNC: 9 MMOL/L (ref 5–15)
AORTIC DIMENSIONLESS INDEX: 0.9 (DI)
ASCENDING AORTA: 2.7 CM
BASOPHILS # BLD AUTO: 0.03 10*3/MM3 (ref 0–0.2)
BASOPHILS NFR BLD AUTO: 0.3 % (ref 0–1.5)
BH CV ECHO MEAS - ACS: 1.53 CM
BH CV ECHO MEAS - AO MAX PG: 6.8 MMHG
BH CV ECHO MEAS - AO MEAN PG: 3.6 MMHG
BH CV ECHO MEAS - AO ROOT DIAM: 2.8 CM
BH CV ECHO MEAS - AO V2 MAX: 130.3 CM/SEC
BH CV ECHO MEAS - AO V2 VTI: 30.6 CM
BH CV ECHO MEAS - AVA(I,D): 2.09 CM2
BH CV ECHO MEAS - EDV(CUBED): 68.9 ML
BH CV ECHO MEAS - EDV(MOD-SP2): 81 ML
BH CV ECHO MEAS - EDV(MOD-SP4): 75 ML
BH CV ECHO MEAS - EF(MOD-BP): 65.4 %
BH CV ECHO MEAS - EF(MOD-SP2): 64.2 %
BH CV ECHO MEAS - EF(MOD-SP4): 64 %
BH CV ECHO MEAS - ESV(CUBED): 14.9 ML
BH CV ECHO MEAS - ESV(MOD-SP2): 29 ML
BH CV ECHO MEAS - ESV(MOD-SP4): 27 ML
BH CV ECHO MEAS - FS: 40 %
BH CV ECHO MEAS - IVS/LVPW: 0.93 CM
BH CV ECHO MEAS - IVSD: 0.55 CM
BH CV ECHO MEAS - LAT PEAK E' VEL: 10.6 CM/SEC
BH CV ECHO MEAS - LV MASS(C)D: 63.5 GRAMS
BH CV ECHO MEAS - LV MAX PG: 5.8 MMHG
BH CV ECHO MEAS - LV MEAN PG: 2.36 MMHG
BH CV ECHO MEAS - LV V1 MAX: 120.9 CM/SEC
BH CV ECHO MEAS - LV V1 VTI: 27.8 CM
BH CV ECHO MEAS - LVIDD: 4.1 CM
BH CV ECHO MEAS - LVIDS: 2.46 CM
BH CV ECHO MEAS - LVOT AREA: 2.3 CM2
BH CV ECHO MEAS - LVOT DIAM: 1.71 CM
BH CV ECHO MEAS - LVPWD: 0.6 CM
BH CV ECHO MEAS - MED PEAK E' VEL: 7.8 CM/SEC
BH CV ECHO MEAS - MV A DUR: 0.1 SEC
BH CV ECHO MEAS - MV A MAX VEL: 82.6 CM/SEC
BH CV ECHO MEAS - MV DEC SLOPE: 673.2 CM/SEC2
BH CV ECHO MEAS - MV DEC TIME: 0.18 SEC
BH CV ECHO MEAS - MV E MAX VEL: 123 CM/SEC
BH CV ECHO MEAS - MV E/A: 1.49
BH CV ECHO MEAS - MV MAX PG: 7.6 MMHG
BH CV ECHO MEAS - MV MEAN PG: 2.9 MMHG
BH CV ECHO MEAS - MV P1/2T: 63.3 MSEC
BH CV ECHO MEAS - MV V2 VTI: 31.1 CM
BH CV ECHO MEAS - MVA(P1/2T): 3.5 CM2
BH CV ECHO MEAS - MVA(VTI): 2.05 CM2
BH CV ECHO MEAS - PA ACC TIME: 0.21 SEC
BH CV ECHO MEAS - PA V2 MAX: 76 CM/SEC
BH CV ECHO MEAS - PULM A REVS DUR: 0.11 SEC
BH CV ECHO MEAS - PULM A REVS VEL: 29.4 CM/SEC
BH CV ECHO MEAS - PULM DIAS VEL: 39.9 CM/SEC
BH CV ECHO MEAS - PULM S/D: 1.2
BH CV ECHO MEAS - PULM SYS VEL: 47.7 CM/SEC
BH CV ECHO MEAS - QP/QS: 0.42
BH CV ECHO MEAS - RAP SYSTOLE: 3 MMHG
BH CV ECHO MEAS - RV MAX PG: 0.92 MMHG
BH CV ECHO MEAS - RV V1 MAX: 48 CM/SEC
BH CV ECHO MEAS - RV V1 VTI: 11.7 CM
BH CV ECHO MEAS - RVOT DIAM: 1.71 CM
BH CV ECHO MEAS - RVSP: 14.5 MMHG
BH CV ECHO MEAS - SV(LVOT): 63.9 ML
BH CV ECHO MEAS - SV(MOD-SP2): 52 ML
BH CV ECHO MEAS - SV(MOD-SP4): 48 ML
BH CV ECHO MEAS - SV(RVOT): 27 ML
BH CV ECHO MEAS - TAPSE (>1.6): 2.29 CM
BH CV ECHO MEAS - TR MAX PG: 11.5 MMHG
BH CV ECHO MEAS - TR MAX VEL: 169.7 CM/SEC
BH CV ECHO MEASUREMENTS AVERAGE E/E' RATIO: 13.37
BH CV XLRA - RV BASE: 2.6 CM
BH CV XLRA - RV LENGTH: 6.4 CM
BH CV XLRA - RV MID: 2.44 CM
BH CV XLRA - TDI S': 10.4 CM/SEC
BUN SERPL-MCNC: 3 MG/DL (ref 8–23)
BUN/CREAT SERPL: 4.2 (ref 7–25)
CALCIUM SPEC-SCNC: 9.1 MG/DL (ref 8.6–10.5)
CHLORIDE SERPL-SCNC: 107 MMOL/L (ref 98–107)
CO2 SERPL-SCNC: 25 MMOL/L (ref 22–29)
CREAT SERPL-MCNC: 0.71 MG/DL (ref 0.57–1)
CYTO UR: NORMAL
DEPRECATED RDW RBC AUTO: 41.2 FL (ref 37–54)
EGFRCR SERPLBLD CKD-EPI 2021: 92.7 ML/MIN/1.73
EOSINOPHIL # BLD AUTO: 0.12 10*3/MM3 (ref 0–0.4)
EOSINOPHIL NFR BLD AUTO: 1.2 % (ref 0.3–6.2)
ERYTHROCYTE [DISTWIDTH] IN BLOOD BY AUTOMATED COUNT: 12.6 % (ref 12.3–15.4)
GLUCOSE SERPL-MCNC: 113 MG/DL (ref 65–99)
HCT VFR BLD AUTO: 37 % (ref 34–46.6)
HGB BLD-MCNC: 12.2 G/DL (ref 12–15.9)
IMM GRANULOCYTES # BLD AUTO: 0.05 10*3/MM3 (ref 0–0.05)
IMM GRANULOCYTES NFR BLD AUTO: 0.5 % (ref 0–0.5)
LAB AP CASE REPORT: NORMAL
LEFT ATRIUM VOLUME INDEX: 16.5 ML/M2
LYMPHOCYTES # BLD AUTO: 2.48 10*3/MM3 (ref 0.7–3.1)
LYMPHOCYTES NFR BLD AUTO: 25.4 % (ref 19.6–45.3)
MAGNESIUM SERPL-MCNC: 1.9 MG/DL (ref 1.6–2.4)
MCH RBC QN AUTO: 29.5 PG (ref 26.6–33)
MCHC RBC AUTO-ENTMCNC: 33 G/DL (ref 31.5–35.7)
MCV RBC AUTO: 89.4 FL (ref 79–97)
MONOCYTES # BLD AUTO: 0.57 10*3/MM3 (ref 0.1–0.9)
MONOCYTES NFR BLD AUTO: 5.8 % (ref 5–12)
NEUTROPHILS NFR BLD AUTO: 6.53 10*3/MM3 (ref 1.7–7)
NEUTROPHILS NFR BLD AUTO: 66.8 % (ref 42.7–76)
NRBC BLD AUTO-RTO: 0 /100 WBC (ref 0–0.2)
PATH REPORT.FINAL DX SPEC: NORMAL
PATH REPORT.GROSS SPEC: NORMAL
PHOSPHATE SERPL-MCNC: 2.7 MG/DL (ref 2.5–4.5)
PLATELET # BLD AUTO: 184 10*3/MM3 (ref 140–450)
PMV BLD AUTO: 9.7 FL (ref 6–12)
POTASSIUM SERPL-SCNC: 3.2 MMOL/L (ref 3.5–5.2)
RBC # BLD AUTO: 4.14 10*6/MM3 (ref 3.77–5.28)
SINUS: 2.9 CM
SODIUM SERPL-SCNC: 141 MMOL/L (ref 136–145)
STJ: 2.08 CM
WBC NRBC COR # BLD AUTO: 9.78 10*3/MM3 (ref 3.4–10.8)

## 2024-08-28 PROCEDURE — G0378 HOSPITAL OBSERVATION PER HR: HCPCS

## 2024-08-28 PROCEDURE — 80069 RENAL FUNCTION PANEL: CPT | Performed by: INTERNAL MEDICINE

## 2024-08-28 PROCEDURE — 99214 OFFICE O/P EST MOD 30 MIN: CPT

## 2024-08-28 PROCEDURE — 83735 ASSAY OF MAGNESIUM: CPT | Performed by: INTERNAL MEDICINE

## 2024-08-28 PROCEDURE — 93306 TTE W/DOPPLER COMPLETE: CPT | Performed by: INTERNAL MEDICINE

## 2024-08-28 PROCEDURE — 25810000003 SODIUM CHLORIDE 0.9 % SOLUTION: Performed by: INTERNAL MEDICINE

## 2024-08-28 PROCEDURE — 25010000002 METRONIDAZOLE 500 MG/100ML SOLUTION: Performed by: INTERNAL MEDICINE

## 2024-08-28 PROCEDURE — 93306 TTE W/DOPPLER COMPLETE: CPT

## 2024-08-28 PROCEDURE — 97161 PT EVAL LOW COMPLEX 20 MIN: CPT

## 2024-08-28 PROCEDURE — 25510000001 PERFLUTREN 6.52 MG/ML SUSPENSION 2 ML VIAL: Performed by: INTERNAL MEDICINE

## 2024-08-28 PROCEDURE — 25010000002 CEFTRIAXONE PER 250 MG: Performed by: INTERNAL MEDICINE

## 2024-08-28 PROCEDURE — 85025 COMPLETE CBC W/AUTO DIFF WBC: CPT | Performed by: INTERNAL MEDICINE

## 2024-08-28 RX ORDER — METRONIDAZOLE 500 MG/1
500 TABLET ORAL 3 TIMES DAILY
Qty: 15 TABLET | Refills: 0 | Status: SHIPPED | OUTPATIENT
Start: 2024-08-28 | End: 2024-09-02

## 2024-08-28 RX ORDER — AMLODIPINE BESYLATE 5 MG/1
5 TABLET ORAL DAILY
Qty: 30 TABLET | Refills: 0 | Status: SHIPPED | OUTPATIENT
Start: 2024-08-28 | End: 2024-08-28 | Stop reason: HOSPADM

## 2024-08-28 RX ORDER — PANTOPRAZOLE SODIUM 40 MG/1
40 TABLET, DELAYED RELEASE ORAL
Status: DISCONTINUED | OUTPATIENT
Start: 2024-08-28 | End: 2024-08-28 | Stop reason: HOSPADM

## 2024-08-28 RX ORDER — LEVOFLOXACIN 750 MG/1
750 TABLET, FILM COATED ORAL EVERY OTHER DAY
Qty: 3 TABLET | Refills: 0 | Status: SHIPPED | OUTPATIENT
Start: 2024-08-28 | End: 2024-09-03

## 2024-08-28 RX ORDER — POTASSIUM CHLORIDE 750 MG/1
40 TABLET, FILM COATED, EXTENDED RELEASE ORAL EVERY 4 HOURS
Status: DISCONTINUED | OUTPATIENT
Start: 2024-08-28 | End: 2024-08-28 | Stop reason: HOSPADM

## 2024-08-28 RX ORDER — FERROUS GLUCONATE 324(38)MG
324 TABLET ORAL
Qty: 30 TABLET | Refills: 0 | Status: SHIPPED | OUTPATIENT
Start: 2024-08-28

## 2024-08-28 RX ADMIN — POTASSIUM, SODIUM PHOSPHATES 280 MG-160 MG-250 MG ORAL POWDER PACKET 2 PACKET: POWDER IN PACKET at 00:59

## 2024-08-28 RX ADMIN — PERFLUTREN 2 ML: 6.52 INJECTION, SUSPENSION INTRAVENOUS at 08:25

## 2024-08-28 RX ADMIN — METRONIDAZOLE 500 MG: 500 INJECTION, SOLUTION INTRAVENOUS at 11:00

## 2024-08-28 RX ADMIN — CEFTRIAXONE 2000 MG: 2 INJECTION, POWDER, FOR SOLUTION INTRAMUSCULAR; INTRAVENOUS at 10:30

## 2024-08-28 RX ADMIN — METRONIDAZOLE 500 MG: 500 INJECTION, SOLUTION INTRAVENOUS at 01:00

## 2024-08-28 RX ADMIN — SODIUM CHLORIDE 75 ML/HR: 9 INJECTION, SOLUTION INTRAVENOUS at 04:20

## 2024-08-28 RX ADMIN — PANTOPRAZOLE SODIUM 40 MG: 40 TABLET, DELAYED RELEASE ORAL at 10:55

## 2024-08-28 NOTE — THERAPY EVALUATION
Patient Name: Jodi Maier  : 1955    MRN: [id number]                              Today's Date: 2024       Admit Date: 2024    Visit Dx:     ICD-10-CM ICD-9-CM   1. Gastrointestinal hemorrhage, unspecified gastrointestinal hemorrhage type  K92.2 578.9   2. Vasovagal near syncope  R55 780.2   3. Generalized abdominal pain  R10.84 789.07   4. Colitis  K52.9 558.9   5. Colitis, ischemic  K55.9 557.9   6. Dizziness  R42 780.4     Patient Active Problem List   Diagnosis    Lesion of endometrium    PMB (postmenopausal bleeding)    GI bleed    Dizziness    Asthma    Hypertension    Colitis, ischemic    Abdominal pain     Past Medical History:   Diagnosis Date    Acid reflux     Asthma     Hypertension     Pelvic pain     PMB (postmenopausal bleeding)      Past Surgical History:   Procedure Laterality Date    COLONOSCOPY N/A 2024    Procedure: COLONOSCOPY to cecum and TI with cold biopsies;  Surgeon: Eva Mosher MD;  Location: [facility] ENDOSCOPY;  Service: Gastroenterology;  Laterality: N/A;  Pre - GI Bleed, colitis.  Post - diverticulosis, colitis, hemorrhoids    D & C HYSTEROSCOPY N/A 2024    Procedure: DILATATION AND CURETTAGE HYSTEROSCOPY WITH MYOSURE;  Surgeon: Shamar Reyes MD;  Location: [facility] MAIN OR;  Service: Obstetrics/Gynecology;  Laterality: N/A;    NO PAST SURGERIES        General Information       Row Name 24 1429          Physical Therapy Time and Intention    Document Type evaluation  -PC     Mode of Treatment physical therapy  -PC       Row Name 24 1429          General Information    Patient Profile Reviewed yes  -PC     Prior Level of Function independent:  -PC     Barriers to Rehab none identified  -PC       Row Name 24 1429          Living Environment    People in Home child(felix), adult;grandchild(felix)  -PC       Row Name 24 1429          Cognition    Orientation Status (Cognition) oriented x 4  -PC               User Key  (r) =  Recorded By, (t) = Taken By, (c) = Cosigned By      Initials Name Provider Type    PC Barbara Mims, PT Physical Therapist                   Mobility       Row Name 08/28/24 1429          Bed Mobility    Bed Mobility bed mobility (all) activities  -PC     All Activities, [place] (Bed Mobility) independent  -PC       Row Name 08/28/24 1429          Sit-Stand Transfer    Sit-Stand [place] (Transfers) independent  -PC       Row Name 08/28/24 1429          Gait/Stairs (Locomotion)    [place] Level (Gait) independent  -PC     Distance in Feet (Gait) 150  -PC     Comment, (Gait/Stairs) normal gait pattern with no loss of balance  -PC               User Key  (r) = Recorded By, (t) = Taken By, (c) = Cosigned By      Initials Name Provider Type    PC Barbara Mims, PT Physical Therapist                   Obj/Interventions       Row Name 08/28/24 1429          Range of Motion Comprehensive    General Range of Motion no range of motion deficits identified  -       Row Name 08/28/24 1429          Strength Comprehensive (MMT)    General Manual Muscle Testing (MMT) Assessment no strength deficits identified  -PC       Row Name 08/28/24 1429          Balance    Comment, Balance WNL  -PC               User Key  (r) = Recorded By, (t) = Taken By, (c) = Cosigned By      Initials Name Provider Type    Barbara Green, PT Physical Therapist                   Goals/Plan    No documentation.                  Clinical Impression       Row Name 08/28/24 1430          Pain    Pretreatment Pain Rating 0/10 - no pain  -PC     Posttreatment Pain Rating 0/10 - no pain  -PC       Row Name 08/28/24 1430          Plan of Care Review    Plan of Care Reviewed With patient  -PC     Outcome Evaluation Pt is a 70 yo female adm with colitis, syncope, underwent colonoscopy yesterday, she is feeling well today and plans discharge home with daughter, pt did well with mobility, she was able to walk around nursing station  independently, no complaints of dizziness, pt is safe to return home with family  -PC       Row Name 08/28/24 1430          Therapy Assessment/Plan (PT)    Criteria for Skilled Interventions Met (PT) no problems identified which require skilled intervention  -PC     Therapy Frequency (PT) evaluation only  -PC       Row Name 08/28/24 1430          Positioning and Restraints    Pre-Treatment Position in bed  -PC     Post Treatment Position bed  -PC     In Bed supine;call light within reach;encouraged to call for assist;exit alarm on  -PC               User Key  (r) = Recorded By, (t) = Taken By, (c) = Cosigned By      Initials Name Provider Type    PC Barbara Mims, PT Physical Therapist                   Outcome Measures       Row Name 08/28/24 1432          How much help from another person do you currently need...    Turning from your back to your side while in flat bed without using bedrails? 4  -PC     Moving from lying on back to sitting on the side of a flat bed without bedrails? 4  -PC     Moving to and from a bed to a chair (including a wheelchair)? 4  -PC     Standing up from a chair using your arms (e.g., wheelchair, bedside chair)? 4  -PC     Climbing 3-5 steps with a railing? 4  -PC     To walk in hospital room? 4  -PC     AM-PAC 6 Clicks Score (PT) 24  -PC     Highest Level of Mobility Goal 8 --> Walked 250 feet or more  -PC               User Key  (r) = Recorded By, (t) = Taken By, (c) = Cosigned By      Initials Name Provider Type    PC Barbara Mims PT Physical Therapist                                   PT Recommendation and Plan     Plan of Care Reviewed With: patient  Outcome Evaluation: Pt is a 70 yo female adm with colitis, syncope, underwent colonoscopy yesterday, she is feeling well today and plans discharge home with daughter, pt did well with mobility, she was able to walk around nursing station independently, no complaints of dizziness, pt is safe to return home with family     Time  Calculation:         PT Charges       Row Name 08/28/24 1433             Time Calculation    Start Time 1420  -PC      Stop Time 1428  -PC      Time Calculation (min) 8 min  -PC      PT Received On 08/28/24  -PC                User Key  (r) = Recorded By, (t) = Taken By, (c) = Cosigned By      Initials Name Provider Type    PC Barbara Mims, PT Physical Therapist                  Therapy Charges for Today       Code Description Service Date Service Provider Modifiers Qty    82238862911 HC PT EVAL LOW COMPLEXITY 2 8/28/2024 Barbara Mims, PT GP 1            PT G-Codes  AM-PAC 6 Clicks Score (PT): 24  PT Discharge Summary  Anticipated Discharge Disposition (PT): home    Barbara Mims PT  8/28/2024

## 2024-08-28 NOTE — CASE MANAGEMENT/SOCIAL WORK
Case Management Discharge Note      Final Note: home with f/u from  if able to get orders         Selected Continued Care - Discharged on 8/28/2024 Admission date: 8/25/2024 - Discharge disposition: Home or Self Care      Destination    No services have been selected for the patient.                Durable Medical Equipment    No services have been selected for the patient.                Dialysis/Infusion    No services have been selected for the patient.                Home Medical Care    No services have been selected for the patient.                Therapy    No services have been selected for the patient.                Community Resources    No services have been selected for the patient.                Community & DME    No services have been selected for the patient.                    Transportation Services  Private: Car    Final Discharge Disposition Code: 01 - home or self-care

## 2024-08-28 NOTE — SIGNIFICANT NOTE
08/28/24 1440   OTHER   Discipline occupational therapist   Rehab Time/Intention   Session Not Performed other (see comments)  (Screen completed and pt with no acute OT needs. Per PT, pt up ambulating independently around unit and plans to d/c home with family later today. Will sign off at this time.)   Therapy Assessment/Plan (PT)   Criteria for Skilled Interventions Met (PT) no problems identified which require skilled intervention

## 2024-08-28 NOTE — PLAN OF CARE
Problem: Adult Inpatient Plan of Care  Goal: Absence of Hospital-Acquired Illness or Injury  Outcome: Ongoing, Progressing  Intervention: Identify and Manage Fall Risk  Recent Flowsheet Documentation  Taken 8/28/2024 0400 by Sivakumar Starr RN  Safety Promotion/Fall Prevention:   safety round/check completed   room organization consistent   nonskid shoes/slippers when out of bed   lighting adjusted   fall prevention program maintained   clutter free environment maintained   assistive device/personal items within reach  Taken 8/28/2024 0200 by Sivakumar Starr RN  Safety Promotion/Fall Prevention:   safety round/check completed   room organization consistent   nonskid shoes/slippers when out of bed   lighting adjusted   fall prevention program maintained   clutter free environment maintained   assistive device/personal items within reach  Taken 8/28/2024 0000 by Sivakumar Starr RN  Safety Promotion/Fall Prevention:   safety round/check completed   room organization consistent   nonskid shoes/slippers when out of bed   lighting adjusted   fall prevention program maintained   clutter free environment maintained   assistive device/personal items within reach  Taken 8/27/2024 2200 by Sivakumar Starr RN  Safety Promotion/Fall Prevention:   safety round/check completed   room organization consistent   nonskid shoes/slippers when out of bed   lighting adjusted   fall prevention program maintained   clutter free environment maintained   assistive device/personal items within reach  Taken 8/27/2024 2000 by Sivakumar Starr, RN  Safety Promotion/Fall Prevention:   safety round/check completed   room organization consistent   nonskid shoes/slippers when out of bed   lighting adjusted   fall prevention program maintained   clutter free environment maintained   assistive device/personal items within reach  Intervention: Prevent Skin Injury  Recent Flowsheet Documentation  Taken  8/28/2024 0400 by Sivakumar Starr RN  Body Position: position changed independently  Taken 8/28/2024 0200 by Sivakumar Starr RN  Body Position: position changed independently  Taken 8/28/2024 0000 by Sivakumar Starr RN  Body Position: position changed independently  Taken 8/27/2024 2200 by Sivakumar Starr RN  Body Position: position changed independently  Taken 8/27/2024 2000 by Sivakumar Starr RN  Body Position: position changed independently  Intervention: Prevent and Manage VTE (Venous Thromboembolism) Risk  Recent Flowsheet Documentation  Taken 8/28/2024 0400 by Sivakumar Starr RN  Activity Management: activity minimized  Taken 8/28/2024 0200 by Sivakumar Starr RN  Activity Management: activity minimized  Taken 8/28/2024 0000 by Sivakumar Starr RN  Activity Management: activity minimized  Taken 8/27/2024 2200 by Sivakumar Starr RN  Activity Management: activity minimized  Taken 8/27/2024 2000 by Sivakumar Starr RN  Activity Management: activity encouraged  Range of Motion: active ROM (range of motion) encouraged  Intervention: Prevent Infection  Recent Flowsheet Documentation  Taken 8/28/2024 0400 by Sivakumar Starr RN  Infection Prevention:   rest/sleep promoted   personal protective equipment utilized   hand hygiene promoted   environmental surveillance performed  Taken 8/28/2024 0200 by Sivakumar Starr RN  Infection Prevention:   rest/sleep promoted   personal protective equipment utilized   hand hygiene promoted   environmental surveillance performed   Goal Outcome Evaluation:  Plan of Care Reviewed With: patient        Progress: improving

## 2024-08-28 NOTE — DISCHARGE SUMMARY
Date of Admission: 8/25/2024  Date of Discharge:  8/28/2024  Primary Care Physician: Provider, No Known     Discharge Diagnosis:  Active Hospital Problems    Diagnosis  POA    **Colitis, ischemic [K55.9]  Yes    Dizziness [R42]  Yes    Asthma [J45.909]  Yes    Hypertension [I10]  Yes    Abdominal pain [R10.9]  Yes      Resolved Hospital Problems   No resolved problems to display.       Presenting Problem/History of Present Illness from H&P:  GI bleed [K92.2]  Vasovagal near syncope [R55]  Gastrointestinal hemorrhage, unspecified gastrointestinal hemorrhage type [K92.2]     Ms. Karo Maier is a 69 y.o. non-smoker with a history of hypertension, asthma, and GERD that presents to [facility] complaining of abdominal pain, nausea, and vomiting. She reports she had a D&C 3 days ago for an endometrial mass. She reports some worsening abdominal pain that is described as intermittent, moderate, and cramping in nature.  She states she initially had some constipation but now she is having some diarrhea and her stools are dark in color. She also reports stool incontinence twice today.  She states she has been taking Ibuprofen every 7 hours since her D&C for pain. She reports  nausea and one episode of non-bloody emesis yesterday. She also reports some vaginal spotting but denies a large amount of vaginal blood.  She denies chest pain, shortness of breath, fever, and chills. She reports dizziness that has been ongoing for months but has worsened in the past few days. Per the ED provider, she had a near syncopal episode on the toilet in the ED. Work up revealed creatinine 1.32 and WBC 14.77. A CT of the head showed mild small vessel disease but was negative for an acute intracranial process. A CT angiogram of the chest was negative for an acute pulmonary embolism. A CT of the abdomen/pelvis showed mild pericolonic fat stranding with liquefied stool within portions of the colon, concerning for nonspecific  colitis. Additionally, the distal sigmoid colon and rectum are moderately distended with air, inspissated stool, and liquefied stool, which suggests some degree of distal constipation. She has been admitted for further evaluation.     Hospital Course:  The patient is a 69 y.o. female who presented with ischemic colitis and vasovagal syncope.  She was admitted and had colitis.  Gastroenterology consulted.  She did have some blood with bowel movement.  She was positive on sepsis screening however blood cultures were negative.  She was given Rocephin and Flagyl inpatient.  She underwent colonoscopy.  See results below.  The pathology is consistent with ischemic colitis.  Transitioning to oral antibiotics completed antibiotic course and she can follow-up with GI in clinic.    Her blood pressure has been low and given the vasovagal symptoms as well as GI bleed blood pressure medicines were held.  Pressure is currently not grossly elevated so we will discharge off of her 3 blood pressure medicines.  Current systolic pressures in the 110s.  She will need close follow-up with primary care for blood pressure check and stepwise resumption of medications as needed.  Would avoid hypotension given her ischemic colitis.    She had a D&C few days ago for dysfunctional uterine bleeding.  She is going to continue to follow-up with her GYN physician after discharge.    Exam Today:  Constitutional:       General: She is not in acute distress.     Appearance: She is not diaphoretic.   Cardiovascular:      Rate and Rhythm: Normal rate and regular rhythm.      Pulses: Normal pulses.   Pulmonary:      Effort: Pulmonary effort is normal.      Breath sounds: No wheezing.   Abdominal:      Palpations: Abdomen is soft.      Tenderness: There is no abdominal tenderness.   Skin:     General: Skin is warm and dry.   Neurological:      Mental Status: She is alert. Mental status is at baseline.   Psychiatric:         Mood and Affect: Mood normal.          Behavior: Behavior normal.    Results:  Final Diagnosis   1.  Ascending colon, biopsies:               -Benign colonic mucosa with focal congestion and changes suggestive of early ischemia.               -No viral inclusions, intestinal parasites or granulomas identified on routine staining.               -Negative for dysplasia.     2.  Descending colon, biopsies:               -Congestion changes consistent with ischemic colitis.               -No viral inclusions, intestinal parasites no granulomas identified on routine staining.               -Negative for dysplasia.     CT Head/C-Spine  1.  No acute intracranial hemorrhage. Mild small vessel ischemic  disease. If there is clinical concern for acute infarction, this would  be better assessed with MRI.  2.  No acute cervical spine fracture.    CTA Chest/CT Abdomen and Pelvis  1.  No definite acute PE, as above.  2.  Mild pericolonic fat stranding with liquefied stool within portions  of the colon, concerning for a nonspecific colitis in the appropriate  clinical setting. Additionally, the distal sigmoid colon and rectum are  moderately distended with air, inspissated stool, and liquefied stool,  which suggests some degree of distal constipation.  3.  Tiny hiatal hernia.    TTE    Left ventricular systolic function is normal. Calculated left ventricular EF = 65.4%    Left ventricular diastolic function was indeterminate.    Normal right ventricular size and function present.    Procedures Performed:  Procedure(s):  COLONOSCOPY to cecum and TI with cold biopsies  08/27 3516 Colonoscopy    Consults:   Consults       Date and Time Order Name Status Description    8/26/2024  1:09 AM Inpatient Gastroenterology Consult Completed     8/25/2024  9:37 PM [facility] (on-call MD unless specified) Details      8/25/2024  8:47 PM Gastroenterology (on-call MD unless specified)               Discharge Disposition:  Home or Self Care    Discharge Medications:     Discharge  Medications        New Medications        Instructions Start Date   ferrous gluconate 324 MG tablet  Commonly known as: FERGON   324 mg, Oral, Daily With Breakfast      levoFLOXacin 750 MG tablet  Commonly known as: Levaquin   750 mg, Oral, Every Other Day      metroNIDAZOLE 500 MG tablet  Commonly known as: Flagyl   500 mg, Oral, 3 Times Daily             Continue These Medications        Instructions Start Date   albuterol sulfate  (90 Base) MCG/ACT inhaler  Commonly known as: PROVENTIL HFA;VENTOLIN HFA;PROAIR HFA   2 puffs, Inhalation, Every 4 Hours PRN      cetirizine 10 MG tablet  Commonly known as: zyrTEC   10 mg, Oral, Every Night at Bedtime      Fluticasone-Salmeterol 250-50 MCG/ACT DISKUS  Commonly known as: ADVAIR/WIXELA   1 puff, Inhalation, Daily      HYDROcodone-acetaminophen 5-325 MG per tablet  Commonly known as: Norco   1 tablet, Oral, Every 6 Hours PRN      MAGNESIUM PO   1 tablet, Oral, Daily      montelukast 10 MG tablet  Commonly known as: SINGULAIR   10 mg, Oral, Nightly      UNKNOWN TO PATIENT   1 puff, Inhalation, Daily, INSTRUCTED TO BRING DAY OF SURGERY             Stop These Medications      amLODIPine 10 MG tablet  Commonly known as: NORVASC     enalapril 20 MG tablet  Commonly known as: VASOTEC     hydroCHLOROthiazide 25 MG tablet     ibuprofen 600 MG tablet  Commonly known as: ADVIL,MOTRIN              Discharge Diet:   Diet Instructions       Advance Diet As Tolerated -Target Diet: home diet      Target Diet: home diet            Activity at Discharge:   Activity Instructions       Activity as Tolerated              Follow-up Appointments:   Follow-up Information       Provider, No Known Follow up.    Contact information:  [facility] [address] [phone]               Eva Mosher MD Follow up.    Specialty: Gastroenterology  Contact information:  [facility] [address] [id number] [phone]                             Test Results  Pending at Discharge:  Pending Labs       Order Current Status    Blood Culture - Blood, Arm, Left Preliminary result    Blood Culture - Blood, Hand, Right Preliminary result             Daniel Ayers MD  08/28/24  13:56 EDT    Time Spent on Discharge Activities: >30 minutes    Dictated portions using Dragon dictation software.

## 2024-08-28 NOTE — CASE MANAGEMENT/SOCIAL WORK
Continued Stay Note  [facility]     Patient Name: Jodi Maier  MRN: [id number]  Today's Date: 8/28/2024    Admit Date: 8/25/2024    Plan: Home with pending [facility]   Discharge Plan       Row Name 08/28/24 1411       Plan    Plan Home with pending [facility]    Patient/Family in Agreement with Plan yes    Plan Comments Did discover pt PCP is Guadalupe Alfaro, unfortunately they close on Wednesdays at 1300. I have asked [facility]/Danyelle to reach out to their office to try to get orders when they are open. Updated RN who will update pt. Dtr. HH orders received from Dr. Ayers and entered. Pt plans home with family to transport, no further needs. -Trixie ROWE                   Discharge Codes    No documentation.                 Expected Discharge Date and Time       Expected Discharge Date Expected Discharge Time    Aug 28, 2024               Trixie Miranda RN

## 2024-08-28 NOTE — PLAN OF CARE
Goal Outcome Evaluation:  Plan of Care Reviewed With: patient           Outcome Evaluation: Pt is a 70 yo female adm with colitis, syncope, underwent colonoscopy yesterday, she is feeling well today and plans discharge home with daughter, pt did well with mobility, she was able to walk around nursing station independently, no complaints of dizziness, pt is safe to return home with family      Anticipated Discharge Disposition (PT): home

## 2024-08-28 NOTE — PROGRESS NOTES
[facility]  Inpatient Progress Note    Reason for Follow Up:  GI Bleed; colitis     Subjective     Interval History:   Status post colonoscopy yesterday with diffuse and patchy moderate inflammation in descending colon, transverse colon and ascending colon secondary to colitis.  Diverticulosis in entire examined colon, nonbleeding internal hemorrhoids.  Pathology consistent with tissue ischemia.    Patient seen this afternoon.  She reports she is feeling well.  She denies any abdominal pain.  No further bleeding.  She has been tolerating diet well today.    Current Facility-Administered Medications:     acetaminophen (TYLENOL) tablet 650 mg, 650 mg, Oral, Q4H PRN, Eva Mosher MD    albuterol (PROVENTIL) nebulizer solution 0.083% 2.5 mg/3mL, 2.5 mg, Nebulization, Q6H PRN, Eva Mosher MD    budesonide-formoterol (SYMBICORT) 160-4.5 MCG/ACT inhaler 2 puff, 2 puff, Inhalation, BID - RT, Eva Mosher MD, 2 puff at 08/27/24 0858    Calcium Replacement - Follow Nurse / BPA Driven Protocol, , Does not apply, Vidhi VANG Lauren C., MD    cefTRIAXone (ROCEPHIN) 2,000 mg in sodium chloride 0.9 % 100 mL MBP, 2,000 mg, Intravenous, Q24H, Eva Mosher MD, Stopped at 08/27/24 1202    cetirizine (zyrTEC) tablet 10 mg, 10 mg, Oral, Nightly, Eva Mosher MD, 10 mg at 08/27/24 2009    HYDROcodone-acetaminophen (NORCO) 5-325 MG per tablet 1 tablet, 1 tablet, Oral, Q4H PRN, Eva Mosher MD    Magnesium Standard Dose Replacement - Follow Nurse / BPA Driven Protocol, , Does not apply, Vidhi VANG Lauren C., MD    metroNIDAZOLE (FLAGYL) IVPB 500 mg, 500 mg, Intravenous, Q8H, Eva Mosher MD, Last Rate: 200 mL/hr at 08/28/24 1100, 500 mg at 08/28/24 1100    montelukast (SINGULAIR) tablet 10 mg, 10 mg, Oral, Nightly, Eva Mosher MD, 10 mg at 08/27/24 2009    morphine injection 2 mg, 2 mg, Intravenous, Q4H PRN, Eva Mosher MD, 2 mg at 08/26/24 2107     ondansetron (ZOFRAN) injection 4 mg, 4 mg, Intravenous, Q6H PRN, Eva Mosher MD, 4 mg at 08/27/24 0905    pantoprazole (PROTONIX) EC tablet 40 mg, 40 mg, Oral, BID AC, Daniel Ayers MD, 40 mg at 08/28/24 1055    Phosphorus Replacement - Follow Nurse / BPA Driven Protocol, , Does not apply, Vidhi VANG Lauren C., MD    potassium chloride (K-DUR,KLOR-CON) ER tablet 40 mEq, 40 mEq, Oral, Q4H, Daniel Ayers MD    potassium chloride 10 mEq in 100 mL IVPB, 10 mEq, Intravenous, Q1H PRN, Eva Mosher MD    Potassium Replacement - Follow Nurse / BPA Driven Protocol, , Does not apply, Vidhi VANG Lauren C., MD    simethicone (MYLICON) chewable tablet 80 mg, 80 mg, Oral, 4x Daily PRN, Eva Mosher MD, 80 mg at 08/26/24 1701    [COMPLETED] Insert Peripheral IV, , , Once **AND** sodium chloride 0.9 % flush 10 mL, 10 mL, Intravenous, PRN, Eva Mosher MD, 10 mL at 08/27/24 2011    sodium chloride 0.9 % infusion, 75 mL/hr, Intravenous, Continuous, Eva Mosher MD, Last Rate: 75 mL/hr at 08/28/24 0638, 75 mL/hr at 08/28/24 0638  Objective     Vital Signs  Temp:  [98.4 °F (36.9 °C)-99.5 °F (37.5 °C)] 98.4 °F (36.9 °C)  Heart Rate:  [78-93] 78  Resp:  [16-18] 18  BP: ()/(56-67) 120/67  Body mass index is 33.57 kg/m².    Intake/Output Summary (Last 24 hours) at 8/28/2024 1324  Last data filed at 8/28/2024 1036  Gross per 24 hour   Intake 1052.5 ml   Output --   Net 1052.5 ml     I/O this shift:  In: 240 [P.O.:240]  Out: -      Physical Exam:   General: patient awake, alert and cooperative   Cardiovascular: regular rhythm and rate   Pulm: regular and unlabored   Abdomen: soft, nontender, nondistended; normal bowel sounds  Results Review:     I reviewed the patient's new clinical results.    Results from last 7 days   Lab Units 08/28/24  1040 08/27/24  0616 08/27/24  0009 08/26/24  1626   WBC 10*3/mm3 9.78 15.32*  --  12.81*   HEMOGLOBIN g/dL 12.2 11.2* 12.1 12.2  12.2   HEMATOCRIT %  37.0 33.1* 35.2 35.8  35.8   PLATELETS 10*3/mm3 184 179  --  202     Results from last 7 days   Lab Units 08/28/24  1040 08/27/24  0616 08/26/24  1626 08/26/24  0558 08/25/24 2021   SODIUM mmol/L 141 137 140   < > 138   POTASSIUM mmol/L 3.2* 3.7 3.2*   < > 3.5   CHLORIDE mmol/L 107 106 105   < > 103   CO2 mmol/L 25.0 25.0 24.9   < > 22.3   BUN mg/dL 3* 6* 11   < > 19   CREATININE mg/dL 0.71 0.69 0.85   < > 1.32*   CALCIUM mg/dL 9.1 8.4* 8.7   < > 9.4   BILIRUBIN mg/dL  --   --  0.4  --  0.2   ALK PHOS U/L  --   --  69  --  72   ALT (SGPT) U/L  --   --  13  --  14   AST (SGOT) U/L  --   --  17  --  17   GLUCOSE mg/dL 113* 112* 114*   < > 119*    < > = values in this interval not displayed.     Results from last 7 days   Lab Units 08/26/24  1626   INR  1.02     Lab Results   Lab Value Date/Time    LIPASE 103 (H) 08/25/2024 2021         Radiology:  CT Abdomen Pelvis With Contrast   Final Result   COMBINED IMPRESSION:       1.  No definite acute PE, as above.   2.  Mild pericolonic fat stranding with liquefied stool within portions   of the colon, concerning for a nonspecific colitis in the appropriate   clinical setting. Additionally, the distal sigmoid colon and rectum are   moderately distended with air, inspissated stool, and liquefied stool,   which suggests some degree of distal constipation.   3.  Tiny hiatal hernia.               This report was finalized on 8/25/2024 9:18 PM by Dr. Alexia Watson M.D   on Workstation: [id number]          CT Angiogram Chest   Final Result   COMBINED IMPRESSION:       1.  No definite acute PE, as above.   2.  Mild pericolonic fat stranding with liquefied stool within portions   of the colon, concerning for a nonspecific colitis in the appropriate   clinical setting. Additionally, the distal sigmoid colon and rectum are   moderately distended with air, inspissated stool, and liquefied stool,   which suggests some degree of distal constipation.   3.  Tiny hiatal hernia.                This report was finalized on 8/25/2024 9:18 PM by Dr. Alexia Watson M.D   on Workstation: [id number]          CT Head Without Contrast   Final Result   1.  No acute intracranial hemorrhage. Mild small vessel ischemic   disease. If there is clinical concern for acute infarction, this would   be better assessed with MRI.   2.  No acute cervical spine fracture.       This report was finalized on 8/25/2024 9:08 PM by Dr. Alexia Watson M.D   on Workstation: [id number]          CT Cervical Spine Without Contrast   Final Result   1.  No acute intracranial hemorrhage. Mild small vessel ischemic   disease. If there is clinical concern for acute infarction, this would   be better assessed with MRI.   2.  No acute cervical spine fracture.       This report was finalized on 8/25/2024 9:08 PM by Dr. Alexia Watson M.D   on Workstation: [id number]              Assessment & Plan     Active Hospital Problems    Diagnosis     **Colitis     Dizziness     Asthma     Hypertension     Abdominal pain        Assessment:  Colitis -seen on colonoscopy throughout entire colon.  Malaise  Abdominal discomfort    Plan:  Path consistent w/ ischemic colitis - would recommend ensuring good BP and avoiding hypotension  Continue antibiotics - it appears patient is being discharged today and has been sent w/ levofloxacin and flagyl   Monitor for further episodes of bleeding - Hgb stable at 12.2     Patient being discharged today so we will sign off.     Patient should f/u in clinic in about 2 weeks following discharge     Elke Yarbrough PA-C

## 2024-08-30 ENCOUNTER — HOME HEALTH ADMISSION (OUTPATIENT)
Dept: HOME HEALTH SERVICES | Facility: HOME HEALTHCARE | Age: 69
End: 2024-08-30
Payer: COMMERCIAL

## 2024-09-01 LAB
BACTERIA SPEC AEROBE CULT: NORMAL
BACTERIA SPEC AEROBE CULT: NORMAL

## 2024-09-10 ENCOUNTER — TELEPHONE (OUTPATIENT)
Dept: GASTROENTEROLOGY | Facility: CLINIC | Age: 69
End: 2024-09-10
Payer: COMMERCIAL

## 2024-09-10 NOTE — TELEPHONE ENCOUNTER
Patient notified of results by  number [id number] and recommendations and verbalized understanding     She said she can make the apt that has been scheduled for her.

## 2024-09-10 NOTE — TELEPHONE ENCOUNTER
----- Message from Eva Mosher sent at 9/6/2024  5:12 PM EDT -----  Colon biopsies show ischemic colitis.  This should continue to improve.  Recommend OP f/u - scheduled 9/12.  Pls update pt

## 2024-09-12 ENCOUNTER — OFFICE VISIT (OUTPATIENT)
Dept: GASTROENTEROLOGY | Facility: CLINIC | Age: 69
End: 2024-09-12
Payer: COMMERCIAL

## 2024-09-12 VITALS
SYSTOLIC BLOOD PRESSURE: 137 MMHG | TEMPERATURE: 97 F | BODY MASS INDEX: 23.73 KG/M2 | HEART RATE: 98 BPM | DIASTOLIC BLOOD PRESSURE: 84 MMHG | WEIGHT: 110 LBS | HEIGHT: 57 IN

## 2024-09-12 DIAGNOSIS — R10.84 GENERALIZED ABDOMINAL PAIN: ICD-10-CM

## 2024-09-12 DIAGNOSIS — K55.9 COLITIS, ISCHEMIC: Primary | ICD-10-CM

## 2024-09-12 NOTE — PROGRESS NOTES
"Chief Complaint  Abdominal Pain    Subjective          History of Present Illness    Jodi Maier is a  69 y.o. female presents for follow-up after recent hospital admission.  She will follow-up with Dr. Mosher.    Patient was seen 8/26 through 8/29 after presenting to the hospital with generalized fatigue, poor appetite and intermittent abdominal discomfort for about 2 months.  She had a D&C 3 days prior for endometrial mass and since then had worsening abdominal cramping with some diarrhea.  She denied black or bloody stool.  CT abdomen pelvis showed mild pericolonic fat stranding with liquefied stool in portions of the colon concerning for nonspecific colitis.  C. difficile and GI PCR were both negative.  She underwent colonoscopy as below with pathology consistent with ischemic colitis.    Today patient presents to the office for follow-up with her daughter.  Office iPad used with  to aid in communication.  She reports she has been feeling much better since leaving the hospital.  She denies any further abdominal pain she has been tolerating diet well.  Her weight has been stable.  She denies any black or bloody stool or further diarrhea.  She states that she has no complaints.    8/28/2024 colonoscopy with diffuse and patchy moderate inflammation in descending colon, transverse colon and ascending colon secondary to colitis. Diverticulosis in entire examined colon, nonbleeding internal hemorrhoids. Pathology consistent with tissue ischemia.     Objective   Vital Signs:   /84   Pulse 98   Temp 97 °F (36.1 °C)   Ht 144.8 cm (57\")   Wt 49.9 kg (110 lb)   BMI 23.80 kg/m²       Physical Exam  Constitutional:       General: She is not in acute distress.     Appearance: Normal appearance.   Eyes:      General: No scleral icterus.  Cardiovascular:      Rate and Rhythm: Normal rate.   Pulmonary:      Effort: Pulmonary effort is normal.   Abdominal:      General: Abdomen is flat. Bowel " sounds are normal. There is no distension.      Tenderness: There is no abdominal tenderness. There is no guarding.   Skin:     Coloration: Skin is not jaundiced.   Neurological:      General: No focal deficit present.      Mental Status: She is alert and oriented to person, place, and time.   Psychiatric:         Mood and Affect: Mood normal.         Behavior: Behavior normal.          Result Review :   The following data was reviewed by: Elke Yarbrough PA-C on 09/12/2024:  CMP          8/26/2024    05:58 8/26/2024    16:26 8/27/2024    06:16 8/28/2024    10:40   CMP   Glucose 133  114  112  113    BUN 16  11  6  3    Creatinine 0.89  0.85  0.69  0.71    EGFR 70.7  74.7  94.7  92.7    Sodium 136  140  137  141    Potassium 3.7  3.2  3.7  3.2    Chloride 102  105  106  107    Calcium 9.4  8.7  8.4  9.1    Total Protein  5.9      Albumin  3.6  3.2  3.7    Globulin  2.3      Total Bilirubin  0.4      Alkaline Phosphatase  69      AST (SGOT)  17      ALT (SGPT)  13      Albumin/Globulin Ratio  1.6      BUN/Creatinine Ratio 18.0  12.9  8.7  4.2    Anion Gap 9.8  10.1  6.0  9.0      CBC          8/26/2024    05:58 8/26/2024    16:26 8/27/2024    00:09 8/27/2024    06:16 8/28/2024    10:40   CBC   WBC 13.84  12.81   15.32  9.78    RBC 4.42  4.16   3.82  4.14    Hemoglobin 12.8     12.8  12.2     12.2  12.1  11.2  12.2    Hematocrit 38.5     38.5  35.8     35.8  35.2  33.1  37.0    MCV 87.1  86.1   86.6  89.4    MCH 29.0  29.3   29.3  29.5    MCHC 33.2  34.1   33.8  33.0    RDW 12.5  12.9   12.7  12.6    Platelets 216  202   179  184              Assessment:   Diagnoses and all orders for this visit:    1. Colitis, ischemic (Primary)    2. Generalized abdominal pain          Plan:   -Patient doing much better today.  No further abdominal pain.  She is tolerating diet well and has regular bowel movements.  Pathology consistent with ischemic colitis.  Advised that she keep her blood pressure well-controlled.  She said  that her primary doctor has altered some of her medications because her blood pressure had been too low but she says that it has been more stable now.  It is 137/84 at our office visit today.       Follow Up   Return if symptoms worsen or fail to improve.    Dragon dictation used throughout this note.         Elke Yarbrough PA-C  [facility]  [address] [place] [address]  Office: [phone]

## 2024-09-13 ENCOUNTER — HOME HEALTH ADMISSION (OUTPATIENT)
Dept: HOME HEALTH SERVICES | Facility: HOME HEALTHCARE | Age: 69
End: 2024-09-13
Payer: COMMERCIAL

## 2024-09-13 ENCOUNTER — OFFICE VISIT (OUTPATIENT)
Dept: OBSTETRICS AND GYNECOLOGY | Age: 69
End: 2024-09-13
Payer: COMMERCIAL

## 2024-09-13 VITALS
DIASTOLIC BLOOD PRESSURE: 70 MMHG | HEIGHT: 57 IN | SYSTOLIC BLOOD PRESSURE: 130 MMHG | BODY MASS INDEX: 23.73 KG/M2 | WEIGHT: 110 LBS

## 2024-09-13 DIAGNOSIS — N95.0 PMB (POSTMENOPAUSAL BLEEDING): ICD-10-CM

## 2024-09-13 DIAGNOSIS — Z09 POSTOP CHECK: Primary | ICD-10-CM

## 2024-09-13 PROCEDURE — 1160F RVW MEDS BY RX/DR IN RCRD: CPT | Performed by: OBSTETRICS & GYNECOLOGY

## 2024-09-13 PROCEDURE — 3078F DIAST BP <80 MM HG: CPT | Performed by: OBSTETRICS & GYNECOLOGY

## 2024-09-13 PROCEDURE — 1159F MED LIST DOCD IN RCRD: CPT | Performed by: OBSTETRICS & GYNECOLOGY

## 2024-09-13 PROCEDURE — 99024 POSTOP FOLLOW-UP VISIT: CPT | Performed by: OBSTETRICS & GYNECOLOGY

## 2024-09-13 PROCEDURE — 3075F SYST BP GE 130 - 139MM HG: CPT | Performed by: OBSTETRICS & GYNECOLOGY

## 2024-09-13 RX ORDER — ENALAPRIL MALEATE 20 MG/1
20 TABLET ORAL DAILY
COMMUNITY

## 2024-09-13 NOTE — PROGRESS NOTES
[facility]   Obstetrics and Gynecology   Postop Visit    2024    Patient: Jodi Maier          MR#:[id number]    History of Present Illness    Chief Complaint   Patient presents with    Gynecologic Exam     CC: PO check, pt feeling well,  unable to void       69 y.o. female  status post hysteroscopy with MyoSure resection of polyp.    Path consistent very atropic endometrium and finding of polyp       Relevant data reviewed:  Tissue Pathology Exam (2024 11:58)      ________________________________________  Patient Active Problem List   Diagnosis    Lesion of endometrium    PMB (postmenopausal bleeding)    GI bleed    Dizziness    Asthma    Hypertension    Colitis, ischemic    Abdominal pain     Past Medical History:   Diagnosis Date    Acid reflux     Asthma     Hypertension     Pelvic pain     PMB (postmenopausal bleeding)      Past Surgical History:   Procedure Laterality Date    COLONOSCOPY N/A 2024    Procedure: COLONOSCOPY to cecum and TI with cold biopsies;  Surgeon: Eva Mosher MD;  Location: [facility] ENDOSCOPY;  Service: Gastroenterology;  Laterality: N/A;  Pre - GI Bleed, colitis.  Post - diverticulosis, colitis, hemorrhoids    D & C HYSTEROSCOPY N/A 2024    Procedure: DILATATION AND CURETTAGE HYSTEROSCOPY WITH MYOSURE;  Surgeon: Shamar Reyes MD;  Location: [facility] MAIN OR;  Service: Obstetrics/Gynecology;  Laterality: N/A;    NO PAST SURGERIES       Social History     Tobacco Use   Smoking Status Never    Passive exposure: Never   Smokeless Tobacco Never     has a current medication list which includes the following prescription(s): albuterol sulfate hfa, cetirizine, enalapril, ferrous gluconate, fluticasone-salmeterol, magnesium, montelukast, hydrocodone-acetaminophen, and UNKNOWN TO PATIENT.  ______________________________________  Review of Systems   Constitutional: Negative.    Respiratory: Negative.     Cardiovascular: Negative.   "  Gastrointestinal: Negative.    Genitourinary: Negative.    Psychiatric/Behavioral: Negative.              Objective   /70   Ht 144.8 cm (57\")   Wt 49.9 kg (110 lb)   LMP  (LMP Unknown)   BMI 23.80 kg/m²    BP Readings from Last 3 Encounters:   09/13/24 130/70   09/12/24 137/84   08/28/24 113/61      Wt Readings from Last 3 Encounters:   09/13/24 49.9 kg (110 lb)   09/12/24 49.9 kg (110 lb)   08/28/24 49.9 kg (110 lb)      BMI: Estimated body mass index is 23.8 kg/m² as calculated from the following:    Height as of this encounter: 144.8 cm (57\").    Weight as of this encounter: 49.9 kg (110 lb).  Physical Exam  Vitals and nursing note reviewed.   Constitutional:       Appearance: Normal appearance. She is well-developed.   HENT:      Head: Normocephalic and atraumatic.   Neck:      Thyroid: No thyromegaly.      Trachea: No tracheal deviation.   Pulmonary:      Effort: Pulmonary effort is normal.   Chest:      Chest wall: No mass.   Breasts:     Right: No mass, nipple discharge or skin change.      Left: No mass, nipple discharge or skin change.   Abdominal:      General: Bowel sounds are normal.      Palpations: Abdomen is soft. There is no mass.      Tenderness: There is no abdominal tenderness. There is no rebound.      Hernia: No hernia is present. There is no hernia in the left inguinal area.   Genitourinary:     Labia:         Right: No rash or lesion.         Left: No rash or lesion.       Vagina: Normal.   Musculoskeletal:      Cervical back: Normal range of motion and neck supple.   Skin:     Findings: No rash.   Neurological:      Mental Status: She is alert and oriented to person, place, and time.      Deep Tendon Reflexes: Reflexes are normal and symmetric.   Psychiatric:         Behavior: Behavior normal.         Thought Content: Thought content normal.         Judgment: Judgment normal.         Assessment:  Diagnoses and all orders for this visit:    1. Postop check (Primary)    2. PMB " (postmenopausal bleeding)        Plan:   Return in about 1 year (around 9/13/2025) for Annual GYN exam.      Shamar Reyes MD  9/13/2024 08:54 EDT

## 2024-09-23 ENCOUNTER — TELEPHONE (OUTPATIENT)
Dept: GASTROENTEROLOGY | Facility: CLINIC | Age: 69
End: 2024-09-23
Payer: COMMERCIAL

## 2024-11-20 ENCOUNTER — TELEPHONE (OUTPATIENT)
Dept: OBSTETRICS AND GYNECOLOGY | Age: 69
End: 2024-11-20

## 2024-11-20 NOTE — TELEPHONE ENCOUNTER
PT had surgery in August for D&C for endometrial mass. PT states she has started to have some pain again in her side/back. PT not bleeding. PT wanted to be seen by Dr. Reyes. Dr. Reyes first available in [place] office on my end is 12/3. Please advise.

## 2024-11-20 NOTE — TELEPHONE ENCOUNTER
Caller: JANY BARBOSA    Relationship to patient: DAUGHTER    Best call back number:     Patient is needing: PT WAS SCHEDULED FOR A GYN FU FOR PAIN AFTER SURGERY WITH DR CHEEK TODAY. SHE THOUGHT SHE WAS SCHEDULED AT THE [place] OFFICE. SHE WAS RESCHEDULED BUT SHE IS STILL SCHEDULED AT THE [place] OFFICE. HUB DID NOT HAVE AVAILABLE APPTS COMING UP WITH DR LAMBERT AT [facility]. PLEASE ASSIST WITH SCHEDULING. PTS DAUGHTER WOULD LIKE YOU TO CALL HER MOTHER BACK AT THE ABOVE NUMBER WITH A  TO RESCHEDULE.

## 2024-11-22 NOTE — TELEPHONE ENCOUNTER
Can you please open up the patient care slot for this PT to have an US on 12/3? PT needs an US added.

## 2024-12-03 ENCOUNTER — OFFICE VISIT (OUTPATIENT)
Dept: OBSTETRICS AND GYNECOLOGY | Age: 69
End: 2024-12-03
Payer: COMMERCIAL

## 2024-12-03 VITALS
DIASTOLIC BLOOD PRESSURE: 86 MMHG | WEIGHT: 117 LBS | BODY MASS INDEX: 25.24 KG/M2 | SYSTOLIC BLOOD PRESSURE: 132 MMHG | HEIGHT: 57 IN

## 2024-12-03 DIAGNOSIS — Z13.89 SCREENING FOR BLOOD OR PROTEIN IN URINE: Primary | ICD-10-CM

## 2024-12-03 DIAGNOSIS — M54.50 ACUTE BILATERAL LOW BACK PAIN WITHOUT SCIATICA: ICD-10-CM

## 2024-12-03 DIAGNOSIS — R10.2 PELVIC PAIN: ICD-10-CM

## 2024-12-03 DIAGNOSIS — K55.9 COLITIS, ISCHEMIC: ICD-10-CM

## 2024-12-03 LAB
BILIRUB BLD-MCNC: NEGATIVE MG/DL
CLARITY, POC: CLEAR
COLOR UR: YELLOW
GLUCOSE UR STRIP-MCNC: NEGATIVE MG/DL
KETONES UR QL: NEGATIVE
LEUKOCYTE EST, POC: NEGATIVE
NITRITE UR-MCNC: NEGATIVE MG/ML
PH UR: 7.5 [PH] (ref 5–8)
PROT UR STRIP-MCNC: NEGATIVE MG/DL
RBC # UR STRIP: NEGATIVE /UL
SP GR UR: 1.01 (ref 1–1.03)
UROBILINOGEN UR QL: NORMAL

## 2024-12-03 RX ORDER — AMLODIPINE BESYLATE 10 MG/1
TABLET ORAL
COMMUNITY
Start: 2024-11-12

## 2024-12-03 RX ORDER — BLOOD-GLUCOSE METER
EACH MISCELLANEOUS
COMMUNITY
Start: 2024-11-12

## 2024-12-03 RX ORDER — GUAIFENESIN 600 MG/1
TABLET, EXTENDED RELEASE ORAL
COMMUNITY
Start: 2024-11-12

## 2024-12-03 RX ORDER — HYDROCHLOROTHIAZIDE 25 MG/1
TABLET ORAL
COMMUNITY
Start: 2024-11-12

## 2024-12-03 RX ORDER — IPRATROPIUM BROMIDE AND ALBUTEROL SULFATE 2.5; .5 MG/3ML; MG/3ML
SOLUTION RESPIRATORY (INHALATION)
COMMUNITY
Start: 2024-11-12

## 2024-12-03 NOTE — PROGRESS NOTES
[facility]   Obstetrics and Gynecology     12/3/2024      Patient:  Jodi Maier   MR#:[id number]    Office note    Chief Complaint   Patient presents with    Gynecologic Exam     CC:GYN, Pain in side and back D&C , last pap 24 neg, hpv neg,        Subjective     History of Present Illness  69 y.o. female  presents complaining of intermittent lower back pain that radiates to her groin region and some pain in the suprapubic region that she ranks a 5 out of 10 at its worst.  The pain seems to be provoked by exercise walking and sitting for prolonged time.    Repeat ultrasound today shows a small innocuous appearing uterus    Relevant data reviewed:       Objective   Patient Active Problem List   Diagnosis    Lesion of endometrium    PMB (postmenopausal bleeding)    GI bleed    Dizziness    Asthma    Hypertension    Colitis, ischemic    Abdominal pain       Past Medical History:   Diagnosis Date    Acid reflux     Asthma     Hypertension     Pelvic pain     PMB (postmenopausal bleeding)      Past Surgical History:   Procedure Laterality Date    COLONOSCOPY N/A 2024    Procedure: COLONOSCOPY to cecum and TI with cold biopsies;  Surgeon: Eva Mosher MD;  Location: [facility] ENDOSCOPY;  Service: Gastroenterology;  Laterality: N/A;  Pre - GI Bleed, colitis.  Post - diverticulosis, colitis, hemorrhoids    D & C HYSTEROSCOPY N/A 2024    Procedure: DILATATION AND CURETTAGE HYSTEROSCOPY WITH MYOSURE;  Surgeon: Shamar Reyes MD;  Location: [facility] OR;  Service: Obstetrics/Gynecology;  Laterality: N/A;    NO PAST SURGERIES       Obstetric History:  OB History          3    Para   2    Term   2            AB   1    Living   2         SAB   1    IAB        Ectopic        Molar        Multiple        Live Births   2               Menstrual History:     No LMP recorded (lmp unknown). Patient is postmenopausal.       # 1 - Date: , Sex: None, Weight:  None, GA: None, Type: None, Apgar1: None, Apgar5: None, Living: None, Birth Comments: None    # 2 - Date: 1975, Sex: Female, Weight: 2863 g (6 lb 5 oz), GA: 40w0d, Type: Vaginal, Spontaneous, Apgar1: None, Apgar5: None, Living: Living, Birth Comments: None    # 3 - Date: 1983, Sex: Female, Weight: 2722 g (6 lb), GA: 40w0d, Type: Vaginal, Spontaneous, Apgar1: None, Apgar5: None, Living: Living, Birth Comments: None    Family History   Problem Relation Age of Onset    Hypertension Mother     Hypertension Father     Breast cancer Neg Hx     Ovarian cancer Neg Hx     Uterine cancer Neg Hx     Colon cancer Neg Hx     Malig Hyperthermia Neg Hx      Social History     Tobacco Use    Smoking status: Never     Passive exposure: Never    Smokeless tobacco: Never   Vaping Use    Vaping status: Never Used   Substance Use Topics    Alcohol use: Not Currently    Drug use: Never     Penicillins    Current Outpatient Medications:     albuterol sulfate  (90 Base) MCG/ACT inhaler, Inhale 2 puffs Every 4 (Four) Hours As Needed for Shortness of Air., Disp: , Rfl:     amLODIPine (NORVASC) 10 MG tablet, , Disp: , Rfl:     cetirizine (zyrTEC) 10 MG tablet, Take 1 tablet by mouth every night at bedtime., Disp: , Rfl:     enalapril (VASOTEC) 20 MG tablet, Take 1 tablet by mouth Daily., Disp: , Rfl:     Fluticasone-Salmeterol (ADVAIR/WIXELA) 250-50 MCG/ACT DISKUS, Inhale 1 puff Daily., Disp: , Rfl:     guaiFENesin (MUCINEX) 600 MG 12 hr tablet, , Disp: , Rfl:     hydroCHLOROthiazide 25 MG tablet, , Disp: , Rfl:     HYDROcodone-acetaminophen (Norco) 5-325 MG per tablet, Take 1 tablet by mouth Every 6 (Six) Hours As Needed for Severe Pain., Disp: 5 tablet, Rfl: 0    ipratropium-albuterol (DUO-NEB) 0.5-2.5 mg/3 ml nebulizer, , Disp: , Rfl:     montelukast (SINGULAIR) 10 MG tablet, Take 1 tablet by mouth Every Night., Disp: , Rfl:     Nebulizers (Easy Air Compressor Nebulizer) misc, , Disp: , Rfl:     ferrous gluconate (FERGON) 324 MG  "tablet, Take 1 tablet by mouth Daily With Breakfast. (Patient not taking: Reported on 12/3/2024), Disp: 30 tablet, Rfl: 0    MAGNESIUM PO, Take 1 tablet by mouth Daily. (Patient not taking: Reported on 12/3/2024), Disp: , Rfl:     UNKNOWN TO PATIENT, Inhale 1 puff Daily. INSTRUCTED TO BRING DAY OF SURGERY (Patient not taking: Reported on 9/13/2024), Disp: , Rfl:     The following portions of the patient's history were reviewed and updated as appropriate: allergies, current medications, past family history, past medical history, past social history, past surgical history, and problem list.    Review of Systems   Gastrointestinal:  Positive for abdominal pain.   Genitourinary:  Negative for vaginal bleeding.   Musculoskeletal:  Positive for back pain. Negative for gait problem.       BP Readings from Last 3 Encounters:   12/03/24 132/86   09/13/24 130/70   09/12/24 137/84      Wt Readings from Last 3 Encounters:   12/03/24 53.1 kg (117 lb)   09/13/24 49.9 kg (110 lb)   09/12/24 49.9 kg (110 lb)      BMI: Estimated body mass index is 25.32 kg/m² as calculated from the following:    Height as of this encounter: 144.8 cm (57\").    Weight as of this encounter: 53.1 kg (117 lb). BSA: Estimated body surface area is 1.43 meters squared as calculated from the following:    Height as of this encounter: 144.8 cm (57\").    Weight as of this encounter: 53.1 kg (117 lb).    Physical Exam  Vitals and nursing note reviewed.   Constitutional:       Appearance: She is well-developed.   HENT:      Head: Normocephalic and atraumatic.   Cardiovascular:      Rate and Rhythm: Normal rate.   Pulmonary:      Effort: Pulmonary effort is normal.   Abdominal:      General: Bowel sounds are normal. There is no distension.      Palpations: Abdomen is soft.      Tenderness: There is no abdominal tenderness.      Comments: Diffuse lower abdominal pain without acute findings and reported diffuse back pain T10 and below   Skin:     General: Skin is " warm and dry.   Neurological:      Mental Status: She is alert and oriented to person, place, and time.   Psychiatric:         Behavior: Behavior normal.         Thought Content: Thought content normal.         Judgment: Judgment normal.            Assessment & Plan   Diagnoses and all orders for this visit:    1. Screening for blood or protein in urine (Primary)  -     POC Urinalysis Dipstick    2. Acute bilateral low back pain without sciatica  -     CT Abdomen Pelvis With Contrast; Future    3. Pelvic pain  -     CT Abdomen Pelvis With Contrast; Future    4. Colitis, ischemic         Somewhat diffuse extensive focal low back pain and pelvic pain without any acute findings.  I favor a nongynecologic etiology.  Imaging shows a very small innocuous appearing uterus with no pelvic masses    No follow-ups on file.        Shamar Reyes MD   12/3/2024 10:52 EST

## 2024-12-29 ENCOUNTER — HOSPITAL ENCOUNTER (OUTPATIENT)
Facility: HOSPITAL | Age: 69
Discharge: HOME | End: 2024-12-29
Admitting: OBSTETRICS & GYNECOLOGY
Payer: COMMERCIAL

## 2024-12-29 DIAGNOSIS — M54.50 ACUTE BILATERAL LOW BACK PAIN WITHOUT SCIATICA: ICD-10-CM

## 2024-12-29 DIAGNOSIS — R10.2 PELVIC PAIN: ICD-10-CM

## 2024-12-29 PROCEDURE — 74177 CT ABD & PELVIS W/CONTRAST: CPT

## 2024-12-29 PROCEDURE — 25510000001 IOPAMIDOL 61 % SOLUTION: Performed by: OBSTETRICS & GYNECOLOGY

## 2024-12-29 PROCEDURE — 25510000002 DIATRIZOATE MEGLUMINE & SODIUM PER 1 ML: Performed by: OBSTETRICS & GYNECOLOGY

## 2024-12-29 RX ORDER — IOPAMIDOL 612 MG/ML
100 INJECTION, SOLUTION INTRAVASCULAR
Status: COMPLETED | OUTPATIENT
Start: 2024-12-29 | End: 2024-12-29

## 2024-12-29 RX ORDER — DIATRIZOATE MEGLUMINE AND DIATRIZOATE SODIUM 660; 100 MG/ML; MG/ML
30 SOLUTION ORAL; RECTAL
Status: COMPLETED | OUTPATIENT
Start: 2024-12-29 | End: 2024-12-29

## 2024-12-29 RX ADMIN — IOPAMIDOL 85 ML: 612 INJECTION, SOLUTION INTRAVENOUS at 13:31

## 2024-12-29 RX ADMIN — DIATRIZOATE MEGLUMINE AND DIATRIZOATE SODIUM 30 ML: 600; 100 SOLUTION ORAL; RECTAL at 13:30

## 2025-06-02 ENCOUNTER — TELEPHONE (OUTPATIENT)
Dept: GASTROENTEROLOGY | Facility: CLINIC | Age: 70
End: 2025-06-02
Payer: MEDICAID

## 2025-06-02 ENCOUNTER — OFFICE VISIT (OUTPATIENT)
Dept: GASTROENTEROLOGY | Facility: CLINIC | Age: 70
End: 2025-06-02
Payer: MEDICAID

## 2025-06-02 VITALS
BODY MASS INDEX: 25.76 KG/M2 | HEART RATE: 92 BPM | TEMPERATURE: 97.3 F | HEIGHT: 57 IN | DIASTOLIC BLOOD PRESSURE: 82 MMHG | WEIGHT: 119.4 LBS | SYSTOLIC BLOOD PRESSURE: 152 MMHG

## 2025-06-02 DIAGNOSIS — R19.7 DIARRHEA, UNSPECIFIED TYPE: Primary | ICD-10-CM

## 2025-06-02 DIAGNOSIS — R10.9 ABDOMINAL DISCOMFORT: ICD-10-CM

## 2025-06-02 DIAGNOSIS — R68.81 EARLY SATIETY: ICD-10-CM

## 2025-06-02 DIAGNOSIS — K92.1 MELENA: ICD-10-CM

## 2025-06-02 PROCEDURE — 3079F DIAST BP 80-89 MM HG: CPT

## 2025-06-02 PROCEDURE — 1159F MED LIST DOCD IN RCRD: CPT

## 2025-06-02 PROCEDURE — 3077F SYST BP >= 140 MM HG: CPT

## 2025-06-02 PROCEDURE — 1160F RVW MEDS BY RX/DR IN RCRD: CPT

## 2025-06-02 PROCEDURE — 99214 OFFICE O/P EST MOD 30 MIN: CPT

## 2025-06-02 RX ORDER — OMEPRAZOLE 40 MG/1
40 CAPSULE, DELAYED RELEASE ORAL
Qty: 180 CAPSULE | Refills: 0 | Status: SHIPPED | OUTPATIENT
Start: 2025-06-02

## 2025-06-02 RX ORDER — OMEPRAZOLE 40 MG/1
CAPSULE, DELAYED RELEASE ORAL
COMMUNITY
Start: 2025-05-06 | End: 2025-06-02 | Stop reason: SDUPTHER

## 2025-06-02 RX ORDER — TERBINAFINE HYDROCHLORIDE 250 MG/1
TABLET ORAL
COMMUNITY
Start: 2025-05-06

## 2025-06-02 NOTE — TELEPHONE ENCOUNTER
PT SCHEDULED AT [facility] 08/22/2025 VERBALIZES UNDERSTANDING [facility] WILL CALL WITH TOA DAY PRIOR TO PROCEDURE EGDPREP INFO SHEET AND [facility] PAMPHLET MAILED TO ADDRESS ON FILE VERIFIED BY PT OK FOR THE HUB TO RELAY

## 2025-06-02 NOTE — Clinical Note
69 yo F w/ history of ischemic colitis who presented w/ 2 weeks abdominal discomfort, decreased appetite, diarrhea and black stool. I checked CBC, CMP and stool studies and recommended EGD w/ reports of melena and early satiety. I can let you know what her CBC shows - she did take pepto bismol at some point but she thought this was after she had already been having black stool. Do you have any availability for her?

## 2025-06-02 NOTE — PROGRESS NOTES
"Chief Complaint  Bloated and Gas    Subjective          History of Present Illness    Jodi Maier is a  70 y.o. female presents for follow-up.  She is a patient of Dr. Mosher and was last seen by me in the office 9/12/2024.    8/28/2024 colonoscopy with diffuse and patchy moderate inflammation in descending colon, transverse colon and ascending colon secondary to colitis. Diverticulosis in entire examined colon, nonbleeding internal hemorrhoids. Pathology consistent with tissue ischemia.     She has been  having watery diarrhea that is black for 2 weeks - stool was only black for 2 days. She reports early satiety as well. No difficulty breathing, fatigue or dizziness. She did take Pepto Bismol around this time but cannot remember whether she took this before or after she noticed the black stool. She has not been taking NSAIDs. She is still taking the omeprazole which does give her some relief. No fevers or chills.     Objective   Vital Signs:   /82   Pulse 92   Temp 97.3 °F (36.3 °C) (Temporal)   Ht 144.8 cm (57\")   Wt 54.2 kg (119 lb 6.4 oz)   BMI 25.84 kg/m²       Physical Exam  Constitutional:       General: She is not in acute distress.     Appearance: Normal appearance.   Eyes:      General: No scleral icterus.  Pulmonary:      Effort: Pulmonary effort is normal.   Abdominal:      General: Abdomen is flat. There is no distension.      Tenderness: There is no abdominal tenderness. There is no guarding.   Skin:     Coloration: Skin is not jaundiced.   Neurological:      General: No focal deficit present.      Mental Status: She is alert and oriented to person, place, and time.   Psychiatric:         Mood and Affect: Mood normal.         Behavior: Behavior normal.          Result Review :   The following data was reviewed by: Elke Yarbrough PA-C on 06/02/2025:  CMP          8/26/2024    05:58 8/26/2024    16:26 8/27/2024    06:16 8/28/2024    10:40   [facility]   Glucose 133  114  112  113  "   BUN 16  11  6  3    Creatinine 0.89  0.85  0.69  0.71    EGFR 70.7  74.7  94.7  92.7    Sodium 136  140  137  141    Potassium 3.7  3.2  3.7  3.2    Chloride 102  105  106  107    Calcium 9.4  8.7  8.4  9.1    Total Protein  5.9      Albumin  3.6  3.2  3.7    Globulin  2.3      Total Bilirubin  0.4      Alkaline Phosphatase  69      AST (SGOT)  17      ALT (SGPT)  13      Albumin/Globulin Ratio  1.6      BUN/Creatinine Ratio 18.0  12.9  8.7  4.2    Anion Gap 9.8  10.1  6.0  9.0      CBC          8/26/2024    05:58 8/26/2024    16:26 8/27/2024    00:09 8/27/2024    06:16 8/28/2024    10:40   CBC   WBC 13.84  12.81   15.32  9.78    RBC 4.42  4.16   3.82  4.14    Hemoglobin 12.8     12.8  12.2     12.2  12.1  11.2  12.2    Hematocrit 38.5     38.5  35.8     35.8  35.2  33.1  37.0    MCV 87.1  86.1   86.6  89.4    MCH 29.0  29.3   29.3  29.5    MCHC 33.2  34.1   33.8  33.0    RDW 12.5  12.9   12.7  12.6    Platelets 216  202   179  184              Assessment:   Diagnoses and all orders for this visit:    1. Diarrhea, unspecified type (Primary)  -     Stool Culture (Reference Lab) - Stool, Per Rectum  -     Clostridioides difficile Toxin, PCR - Stool, Per Rectum  -     Clostridioides difficile EIA - Stool, Per Rectum  -     Ova & Parasite Examination - Stool, Per Rectum  -     CBC (No Diff)  -     Comprehensive Metabolic Panel  -     Case Request; Standing  -     Case Request    2. Melena  -     Stool Culture (Reference Lab) - Stool, Per Rectum  -     Clostridioides difficile Toxin, PCR - Stool, Per Rectum  -     Clostridioides difficile EIA - Stool, Per Rectum  -     Ova & Parasite Examination - Stool, Per Rectum  -     CBC (No Diff)  -     Comprehensive Metabolic Panel  -     Case Request; Standing  -     Case Request  -     omeprazole (priLOSEC) 40 MG capsule; Take 1 capsule by mouth 2 (Two) Times a Day Before Meals.  Dispense: 180 capsule; Refill: 0    3. Early satiety  -     Case Request; Standing  -     Case  Request  -     omeprazole (priLOSEC) 40 MG capsule; Take 1 capsule by mouth 2 (Two) Times a Day Before Meals.  Dispense: 180 capsule; Refill: 0    4. Abdominal discomfort  -     Case Request; Standing  -     Case Request  -     omeprazole (priLOSEC) 40 MG capsule; Take 1 capsule by mouth 2 (Two) Times a Day Before Meals.  Dispense: 180 capsule; Refill: 0    Other orders  -     Implement Anesthesia orders day of procedure.; Standing  -     Follow Anesthesia Guidelines / Protocol; Future  -     Obtain Informed Consent; Standing          Plan:   -Will check CBC given reports of melena and CMP w/ ongoing diarrhea. Will also check stool studies to rule out infectious etiology of diarrhea.  -Recommend EGD given reports of melena and decreased appetite. Will get this scheduled. Patient is agreeable to this.   -Recommend omeprazole 40mg twice daily in the meantime.       Follow Up   No follow-ups on file.    Dragon dictation used throughout this note.         Elke Yarbrough PA-C  [facility]  [address] [place] [address]  Office: [phone]

## 2025-06-03 ENCOUNTER — TELEPHONE (OUTPATIENT)
Dept: GASTROENTEROLOGY | Facility: CLINIC | Age: 70
End: 2025-06-03
Payer: MEDICAID

## 2025-06-03 ENCOUNTER — RESULTS FOLLOW-UP (OUTPATIENT)
Dept: GASTROENTEROLOGY | Facility: CLINIC | Age: 70
End: 2025-06-03
Payer: MEDICAID

## 2025-06-03 LAB
ALBUMIN SERPL-MCNC: 4.8 G/DL (ref 3.5–5.2)
ALBUMIN/GLOB SERPL: 1.7 G/DL
ALP SERPL-CCNC: 115 U/L (ref 39–117)
ALT SERPL-CCNC: 18 U/L (ref 1–33)
AST SERPL-CCNC: 23 U/L (ref 1–32)
BILIRUB SERPL-MCNC: 0.2 MG/DL (ref 0–1.2)
BUN SERPL-MCNC: 17 MG/DL (ref 8–23)
BUN/CREAT SERPL: 19.5 (ref 7–25)
CALCIUM SERPL-MCNC: 10.4 MG/DL (ref 8.6–10.5)
CHLORIDE SERPL-SCNC: 98 MMOL/L (ref 98–107)
CO2 SERPL-SCNC: 30.3 MMOL/L (ref 22–29)
CREAT SERPL-MCNC: 0.87 MG/DL (ref 0.57–1)
EGFRCR SERPLBLD CKD-EPI 2021: 72.2 ML/MIN/1.73
ERYTHROCYTE [DISTWIDTH] IN BLOOD BY AUTOMATED COUNT: 12.8 % (ref 12.3–15.4)
GLOBULIN SER CALC-MCNC: 2.9 GM/DL
GLUCOSE SERPL-MCNC: 90 MG/DL (ref 65–99)
HCT VFR BLD AUTO: 46.7 % (ref 34–46.6)
HGB BLD-MCNC: 15.1 G/DL (ref 12–15.9)
MCH RBC QN AUTO: 29.4 PG (ref 26.6–33)
MCHC RBC AUTO-ENTMCNC: 32.3 G/DL (ref 31.5–35.7)
MCV RBC AUTO: 90.9 FL (ref 79–97)
PLATELET # BLD AUTO: 237 10*3/MM3 (ref 140–450)
POTASSIUM SERPL-SCNC: 4.4 MMOL/L (ref 3.5–5.2)
PROT SERPL-MCNC: 7.7 G/DL (ref 6–8.5)
RBC # BLD AUTO: 5.14 10*6/MM3 (ref 3.77–5.28)
SODIUM SERPL-SCNC: 140 MMOL/L (ref 136–145)
WBC # BLD AUTO: 6.7 10*3/MM3 (ref 3.4–10.8)

## 2025-06-05 LAB
C DIFF TOX A+B STL QL IA: NEGATIVE
C DIFF TOX GENS STL QL NAA+PROBE: NEGATIVE

## 2025-06-08 LAB
BACTERIA SPEC CULT: NORMAL
BACTERIA SPEC CULT: NORMAL
CAMPYLOBACTER STL CULT: NORMAL
E COLI SXT STL QL IA: NEGATIVE
SALM + SHIG STL CULT: NORMAL

## 2025-06-09 ENCOUNTER — TELEPHONE (OUTPATIENT)
Dept: GASTROENTEROLOGY | Facility: CLINIC | Age: 70
End: 2025-06-09
Payer: MEDICAID

## 2025-06-10 LAB
O+P SPEC MICRO: NORMAL
O+P STL CONC: NORMAL

## 2025-06-11 ENCOUNTER — TELEPHONE (OUTPATIENT)
Dept: GASTROENTEROLOGY | Facility: CLINIC | Age: 70
End: 2025-06-11

## 2025-06-11 NOTE — TELEPHONE ENCOUNTER
[facility] staff attempted to follow warm transfer process and was unsuccessful     Caller: JANY BARBOSA    Relationship to patient: Emergency Contact    Best call back number: [phone]    Patient is needing: PT DAUGHTER IS RETURNING MISS CALL FROM OFFICE. PLEASE GIVE PT DAUGHTER A CALL BACK. IF NOT ABLE TO REACH PT DAUGHTER IT IS OKAY TO LVM.

## 2025-06-13 ENCOUNTER — ANESTHESIA EVENT (OUTPATIENT)
Dept: GASTROENTEROLOGY | Facility: HOSPITAL | Age: 70
End: 2025-06-13
Payer: MEDICAID

## 2025-06-13 ENCOUNTER — HOSPITAL ENCOUNTER (OUTPATIENT)
Facility: HOSPITAL | Age: 70
Setting detail: HOSPITAL OUTPATIENT SURGERY
Discharge: HOME | End: 2025-06-13
Attending: INTERNAL MEDICINE | Admitting: INTERNAL MEDICINE
Payer: MEDICAID

## 2025-06-13 ENCOUNTER — ANESTHESIA (OUTPATIENT)
Dept: GASTROENTEROLOGY | Facility: HOSPITAL | Age: 70
End: 2025-06-13
Payer: MEDICAID

## 2025-06-13 VITALS
BODY MASS INDEX: 26.06 KG/M2 | RESPIRATION RATE: 16 BRPM | HEIGHT: 57 IN | DIASTOLIC BLOOD PRESSURE: 71 MMHG | SYSTOLIC BLOOD PRESSURE: 138 MMHG | OXYGEN SATURATION: 96 % | HEART RATE: 73 BPM | WEIGHT: 120.8 LBS

## 2025-06-13 DIAGNOSIS — R19.7 DIARRHEA, UNSPECIFIED TYPE: ICD-10-CM

## 2025-06-13 DIAGNOSIS — R10.9 ABDOMINAL DISCOMFORT: ICD-10-CM

## 2025-06-13 DIAGNOSIS — R68.81 EARLY SATIETY: ICD-10-CM

## 2025-06-13 DIAGNOSIS — K92.1 MELENA: ICD-10-CM

## 2025-06-13 PROCEDURE — 25810000003 LACTATED RINGERS PER 1000 ML: Performed by: STUDENT IN AN ORGANIZED HEALTH CARE EDUCATION/TRAINING PROGRAM

## 2025-06-13 PROCEDURE — 25010000002 PROPOFOL 10 MG/ML EMULSION: Performed by: STUDENT IN AN ORGANIZED HEALTH CARE EDUCATION/TRAINING PROGRAM

## 2025-06-13 PROCEDURE — 25810000003 LACTATED RINGERS PER 1000 ML: Performed by: INTERNAL MEDICINE

## 2025-06-13 PROCEDURE — 43239 EGD BIOPSY SINGLE/MULTIPLE: CPT | Performed by: INTERNAL MEDICINE

## 2025-06-13 PROCEDURE — 25010000002 LIDOCAINE 2% SOLUTION: Performed by: STUDENT IN AN ORGANIZED HEALTH CARE EDUCATION/TRAINING PROGRAM

## 2025-06-13 PROCEDURE — S0260 H&P FOR SURGERY: HCPCS | Performed by: INTERNAL MEDICINE

## 2025-06-13 PROCEDURE — 88305 TISSUE EXAM BY PATHOLOGIST: CPT | Performed by: INTERNAL MEDICINE

## 2025-06-13 RX ORDER — SODIUM CHLORIDE 0.9 % (FLUSH) 0.9 %
10 SYRINGE (ML) INJECTION AS NEEDED
Status: DISCONTINUED | OUTPATIENT
Start: 2025-06-13 | End: 2025-06-13 | Stop reason: HOSPADM

## 2025-06-13 RX ORDER — SODIUM CHLORIDE, SODIUM LACTATE, POTASSIUM CHLORIDE, CALCIUM CHLORIDE 600; 310; 30; 20 MG/100ML; MG/100ML; MG/100ML; MG/100ML
INJECTION, SOLUTION INTRAVENOUS CONTINUOUS PRN
Status: DISCONTINUED | OUTPATIENT
Start: 2025-06-13 | End: 2025-06-13 | Stop reason: SURG

## 2025-06-13 RX ORDER — PROPOFOL 10 MG/ML
VIAL (ML) INTRAVENOUS AS NEEDED
Status: DISCONTINUED | OUTPATIENT
Start: 2025-06-13 | End: 2025-06-13 | Stop reason: SURG

## 2025-06-13 RX ORDER — LIDOCAINE HYDROCHLORIDE 20 MG/ML
INJECTION, SOLUTION INFILTRATION; PERINEURAL AS NEEDED
Status: DISCONTINUED | OUTPATIENT
Start: 2025-06-13 | End: 2025-06-13 | Stop reason: SURG

## 2025-06-13 RX ORDER — SODIUM CHLORIDE, SODIUM LACTATE, POTASSIUM CHLORIDE, CALCIUM CHLORIDE 600; 310; 30; 20 MG/100ML; MG/100ML; MG/100ML; MG/100ML
30 INJECTION, SOLUTION INTRAVENOUS CONTINUOUS PRN
Status: DISCONTINUED | OUTPATIENT
Start: 2025-06-13 | End: 2025-06-13 | Stop reason: HOSPADM

## 2025-06-13 RX ADMIN — PROPOFOL 50 MG: 10 INJECTION, EMULSION INTRAVENOUS at 09:19

## 2025-06-13 RX ADMIN — PROPOFOL 300 MCG/KG/MIN: 10 INJECTION, EMULSION INTRAVENOUS at 09:19

## 2025-06-13 RX ADMIN — SODIUM CHLORIDE, POTASSIUM CHLORIDE, SODIUM LACTATE AND CALCIUM CHLORIDE 30 ML/HR: 600; 310; 30; 20 INJECTION, SOLUTION INTRAVENOUS at 08:41

## 2025-06-13 RX ADMIN — SODIUM CHLORIDE, POTASSIUM CHLORIDE, SODIUM LACTATE AND CALCIUM CHLORIDE: 600; 310; 30; 20 INJECTION, SOLUTION INTRAVENOUS at 09:10

## 2025-06-13 RX ADMIN — LIDOCAINE HYDROCHLORIDE 50 MG: 20 INJECTION, SOLUTION INFILTRATION; PERINEURAL at 09:19

## 2025-06-13 NOTE — ANESTHESIA POSTPROCEDURE EVALUATION
Patient: Jodi Maier    Procedure Summary       Date: 06/13/25 [facility] / [facility] /  [facility]    Anesthesia Start: 0910 Anesthesia Stop: 0933    Procedure: ESOPHAGOGASTRODUODENOSCOPY with biopsies (Esophagus) Diagnosis:       Diarrhea, unspecified type      Melena      Early satiety      Abdominal discomfort      (Diarrhea, unspecified type [R19.7])      (Melena [K92.1])      (Early satiety [R68.81])      (Abdominal discomfort [R10.9])    Surgeons: Eva Mosher MD Provider: Nabor Baez MD    Anesthesia Type: MAC ASA Status: 3            Anesthesia Type: MAC    Vitals  Vitals Value Taken Time   /82 06/13/25 09:27   Temp     Pulse 75 06/13/25 09:27   Resp 17 06/13/25 09:27   SpO2 95 % 06/13/25 09:27           Post Anesthesia Care and Evaluation      Comments: Discharge criteria met per RN

## 2025-06-13 NOTE — H&P
[facility]  Pre Procedure History & Physical    Chief Complaint:   Early satiety, Melena, nausea    Subjective     HPI:   70-year-old female here today for EGD.  She complains of recent early satiety.  She is been nauseated without vomiting.  No heartburn or difficulty swallowing.  She denies abdominal pain but she has had black stool.  No NSAIDs.  She was having some diarrhea but this is resolved.    Past Medical History:   Past Medical History:   Diagnosis Date    Acid reflux     Asthma     Hypertension     Pelvic pain     PMB (postmenopausal bleeding)        Past Surgical History:  Past Surgical History:   Procedure Laterality Date    COLONOSCOPY N/A 8/27/2024    Procedure: COLONOSCOPY to cecum and TI with cold biopsies;  Surgeon: Eva Mosher MD;  Location: [facility] ENDOSCOPY;  Service: Gastroenterology;  Laterality: N/A;  Pre - GI Bleed, colitis.  Post - diverticulosis, colitis, hemorrhoids    D & C HYSTEROSCOPY N/A 8/22/2024    Procedure: DILATATION AND CURETTAGE HYSTEROSCOPY WITH MYOSURE;  Surgeon: Shamar Reyes MD;  Location: [facility] MAIN OR;  Service: Obstetrics/Gynecology;  Laterality: N/A;    NO PAST SURGERIES         Family History:  Family History   Problem Relation Age of Onset    Hypertension Mother     Hypertension Father     Breast cancer Neg Hx     Ovarian cancer Neg Hx     Uterine cancer Neg Hx     Colon cancer Neg Hx     Malig Hyperthermia Neg Hx        Social History:   reports that she has never smoked. She has never been exposed to tobacco smoke. She has never used smokeless tobacco. She reports that she does not currently use alcohol. She reports that she does not use drugs.    Medications:   Medications Prior to Admission   Medication Sig Dispense Refill Last Dose/Taking    albuterol sulfate  (90 Base) MCG/ACT inhaler Inhale 2 puffs Every 4 (Four) Hours As Needed for Shortness of Air.   6/12/2025    amLODIPine (NORVASC) 10 MG tablet    6/12/2025     "enalapril (VASOTEC) 20 MG tablet Take 1 tablet by mouth Daily.   6/12/2025    Fluticasone-Salmeterol (ADVAIR/WIXELA) 250-50 MCG/ACT DISKUS Inhale 1 puff Daily.   6/12/2025    hydroCHLOROthiazide 25 MG tablet    6/12/2025    HYDROcodone-acetaminophen (Norco) 5-325 MG per tablet Take 1 tablet by mouth Every 6 (Six) Hours As Needed for Severe Pain. 5 tablet 0 6/12/2025    ipratropium-albuterol (DUO-NEB) 0.5-2.5 mg/3 ml nebulizer    Past Week    MAGNESIUM PO Take 1 tablet by mouth Daily.   6/12/2025    montelukast (SINGULAIR) 10 MG tablet Take 1 tablet by mouth Every Night.   6/12/2025    Nebulizers (Easy Air Compressor Nebulizer) misc    6/12/2025    omeprazole (priLOSEC) 40 MG capsule Take 1 capsule by mouth 2 (Two) Times a Day Before Meals. 180 capsule 0 6/12/2025    terbinafine (lamiSIL) 250 MG tablet    6/12/2025    cetirizine (zyrTEC) 10 MG tablet Take 1 tablet by mouth every night at bedtime.   Morning    ferrous gluconate (FERGON) 324 MG tablet Take 1 tablet by mouth Daily With Breakfast. 30 tablet 0     guaiFENesin (MUCINEX) 600 MG 12 hr tablet        UNKNOWN TO PATIENT Inhale 1 puff Daily. INSTRUCTED TO BRING DAY OF SURGERY (Patient not taking: Reported on 9/13/2024)          Allergies:  Penicillins    ROS:    Pertinent items are noted in HPI     Objective     Blood pressure 154/94, pulse 81, resp. rate 16, height 144.8 cm (57\"), weight 54.8 kg (120 lb 12.8 oz), SpO2 96%.    Physical Exam   Constitutional: Pt is oriented to person, place, and time and well-developed, well-nourished, and in no distress.   Mouth/Throat: Oropharynx is clear and moist.   Neck: Normal range of motion.   Cardiovascular: Normal rate, regular rhythm    Pulmonary/Chest: Effort normal    Abdominal: Soft. Nontender  Skin: Skin is warm and dry.   Psychiatric: Mood, memory, affect and judgment normal.     Assessment & Plan     Diagnosis:  Early satiety, Melena, nausea    Anticipated Surgical Procedure:  Esophagogastroduodenoscopy with " possible interventions    The risks, benefits, and alternatives of this procedure have been discussed with the patient or the responsible party- the patient understands and agrees to proceed.

## 2025-06-13 NOTE — DISCHARGE INSTRUCTIONS
For the next 24 hours patient needs to be with a responsible adult.    For 24 hours DO NOT drive, operate machinery, appliances, drink alcohol, make important decisions or sign legal documents.    Start with a light or bland diet if you are feeling sick to your stomach otherwise advance to regular diet as tolerated.    Follow recommendations on procedure report if provided by your doctor.    Call Dr Mosher for problems [phone]     Problems may include but not limited to: large amounts of bleeding, trouble breathing, repeated vomiting, severe unrelieved pain, fever or chills.

## 2025-06-13 NOTE — ANESTHESIA PREPROCEDURE EVALUATION
Anesthesia Evaluation     Patient summary reviewed   no history of anesthetic complications:   NPO Solid Status: > 8 hours  NPO Liquid Status: > 2 hours           Airway   Mallampati: II  TM distance: >3 FB  Neck ROM: full  Dental    (+) partials    Pulmonary - normal exam   (+) asthma (well controlled),sleep apnea (at risk)  Cardiovascular - normal exam    (+) hypertension  (-) past MI, CAD, dysrhythmias, angina    ROS comment: Echo 08/2024  Interpretation Summary  ·  Left ventricular systolic function is normal. Calculated left ventricular EF = 65.4%  ·  Left ventricular diastolic function was indeterminate.  ·  Normal right ventricular size and function present.         Neuro/Psych  (-) seizures, TIA, CVA  GI/Hepatic/Renal/Endo    (+) GERD, GI bleeding   (-) liver disease, no renal disease, diabetes, no thyroid disorder    ROS Comment: Watery diarrhea     Musculoskeletal     Abdominal    Substance History      OB/GYN          Other                      Anesthesia Plan    ASA 3     MAC     (Deep sedation/general without secured airway expected by proceduralist )    Anesthetic plan, risks, benefits, and alternatives have been provided, discussed and informed consent has been obtained with: patient and child.    Plan discussed with attending.    CODE STATUS:

## 2025-06-16 LAB
CYTO UR: NORMAL
LAB AP CASE REPORT: NORMAL
PATH REPORT.FINAL DX SPEC: NORMAL
PATH REPORT.GROSS SPEC: NORMAL

## 2025-07-17 ENCOUNTER — TELEPHONE (OUTPATIENT)
Dept: GASTROENTEROLOGY | Facility: CLINIC | Age: 70
End: 2025-07-17
Payer: MEDICAID

## 2025-07-17 NOTE — TELEPHONE ENCOUNTER
Ok for the hub to relay and schedule.  Left vm via  for the pt to call back and schedule a follow up appt With Elke Yarbrough.

## 2025-08-28 ENCOUNTER — OFFICE VISIT (OUTPATIENT)
Dept: GASTROENTEROLOGY | Facility: CLINIC | Age: 70
End: 2025-08-28
Payer: MEDICAID

## 2025-08-28 VITALS
TEMPERATURE: 97.3 F | DIASTOLIC BLOOD PRESSURE: 77 MMHG | HEART RATE: 88 BPM | HEIGHT: 55 IN | SYSTOLIC BLOOD PRESSURE: 147 MMHG | BODY MASS INDEX: 28.46 KG/M2 | WEIGHT: 123 LBS

## 2025-08-28 DIAGNOSIS — R11.0 NAUSEA: ICD-10-CM

## 2025-08-28 DIAGNOSIS — R68.81 EARLY SATIETY: ICD-10-CM

## 2025-08-28 DIAGNOSIS — R19.7 DIARRHEA, UNSPECIFIED TYPE: Primary | ICD-10-CM

## 2025-08-28 DIAGNOSIS — K92.1 MELENA: ICD-10-CM

## 2025-08-28 DIAGNOSIS — R10.9 ABDOMINAL DISCOMFORT: ICD-10-CM

## 2025-08-28 PROCEDURE — 1160F RVW MEDS BY RX/DR IN RCRD: CPT

## 2025-08-28 PROCEDURE — 3077F SYST BP >= 140 MM HG: CPT

## 2025-08-28 PROCEDURE — 3078F DIAST BP <80 MM HG: CPT

## 2025-08-28 PROCEDURE — 99214 OFFICE O/P EST MOD 30 MIN: CPT

## 2025-08-28 PROCEDURE — 1159F MED LIST DOCD IN RCRD: CPT

## 2025-08-28 RX ORDER — OMEPRAZOLE 40 MG/1
40 CAPSULE, DELAYED RELEASE ORAL
Qty: 180 CAPSULE | Refills: 0 | Status: SHIPPED | OUTPATIENT
Start: 2025-08-28

## 2025-08-28 RX ORDER — DICYCLOMINE HCL 20 MG
20 TABLET ORAL
Qty: 120 TABLET | Refills: 0 | Status: SHIPPED | OUTPATIENT
Start: 2025-08-28 | End: 2025-09-27

## 2025-08-28 RX ORDER — LANOLIN ALCOHOL/MO/W.PET/CERES
CREAM (GRAM) TOPICAL
COMMUNITY
Start: 2025-07-04

## (undated) DEVICE — KT ORCA ORCAPOD DISP STRL

## (undated) DEVICE — SINGLE-USE BIOPSY FORCEPS: Brand: RADIAL JAW 4

## (undated) DEVICE — SOL IRR NACL 0.9PCT 3000ML

## (undated) DEVICE — GLV SURG BIOGEL LTX PF 7 1/2

## (undated) DEVICE — CANN O2 ETCO2 FITS ALL CONN CO2 SMPL A/ 7IN DISP LF

## (undated) DEVICE — STRAP STIRUP WO/ RNG

## (undated) DEVICE — TUBING, SUCTION, 1/4" X 10', STRAIGHT: Brand: MEDLINE

## (undated) DEVICE — BLCK/BITE BLOX W/DENTL/RIM W/STRAP 54F

## (undated) DEVICE — DRAPE,UNDERBUTTOCKS,PCH,STERILE: Brand: MEDLINE

## (undated) DEVICE — ADAPT CLN BIOGUARD AIR/H2O DISP

## (undated) DEVICE — DEV TISS REMOV MYOSURE REACH

## (undated) DEVICE — SEAL HYSTERSCOPE/OUTFLOW CHANNEL MYOSURE

## (undated) DEVICE — FRCP BX RADJAW4 NDL 2.8 240CM LG OG BX40

## (undated) DEVICE — NEEDLE, QUINCKE, 20GX3.5": Brand: MEDLINE

## (undated) DEVICE — MSK PROC CURAPLEX O2 2/ADAPT 7FT

## (undated) DEVICE — SENSR O2 OXIMAX FNGR A/ 18IN NONSTR

## (undated) DEVICE — LOU D & C HYSTEROSCOPY: Brand: MEDLINE INDUSTRIES, INC.

## (undated) DEVICE — LN SMPL CO2 SHTRM SD STREAM W/M LUER